# Patient Record
Sex: MALE | Race: WHITE | Employment: FULL TIME | URBAN - METROPOLITAN AREA
[De-identification: names, ages, dates, MRNs, and addresses within clinical notes are randomized per-mention and may not be internally consistent; named-entity substitution may affect disease eponyms.]

---

## 2019-03-02 ENCOUNTER — HOSPITAL ENCOUNTER (OUTPATIENT)
Age: 34
Discharge: HOME | End: 2019-03-02
Payer: COMMERCIAL

## 2019-03-02 LAB
A/G RATIO: 1.5 (ref 1.1–2.2)
ALBUMIN SERPL-MCNC: 4.2 G/DL (ref 3.4–5)
ALP BLD-CCNC: 66 U/L (ref 40–129)
ALT SERPL-CCNC: 30 U/L (ref 10–40)
ANION GAP SERPL CALCULATED.3IONS-SCNC: 11 MMOL/L (ref 3–16)
AST SERPL-CCNC: 24 U/L (ref 15–37)
BASOPHILS ABSOLUTE: 0 K/UL (ref 0–0.2)
BASOPHILS RELATIVE PERCENT: 0.6 %
BILIRUB SERPL-MCNC: 0.4 MG/DL (ref 0–1)
BUN BLDV-MCNC: 12 MG/DL (ref 7–20)
CALCIUM SERPL-MCNC: 9.1 MG/DL (ref 8.3–10.6)
CHLORIDE BLD-SCNC: 99 MMOL/L (ref 99–110)
CHOLESTEROL, TOTAL: 168 MG/DL (ref 0–199)
CO2: 27 MMOL/L (ref 21–32)
CREAT SERPL-MCNC: 0.8 MG/DL (ref 0.9–1.3)
EOSINOPHILS ABSOLUTE: 0.2 K/UL (ref 0–0.6)
EOSINOPHILS RELATIVE PERCENT: 2.4 %
GFR AFRICAN AMERICAN: >60
GFR NON-AFRICAN AMERICAN: >60
GLOBULIN: 2.8 G/DL
GLUCOSE BLD-MCNC: 96 MG/DL (ref 70–99)
HCT VFR BLD CALC: 42.6 % (ref 40.5–52.5)
HDLC SERPL-MCNC: 33 MG/DL (ref 40–60)
HEMOGLOBIN: 14.3 G/DL (ref 13.5–17.5)
LDL CHOLESTEROL CALCULATED: 106 MG/DL
LYMPHOCYTES ABSOLUTE: 2.7 K/UL (ref 1–5.1)
LYMPHOCYTES RELATIVE PERCENT: 43.1 %
MCH RBC QN AUTO: 31.4 PG (ref 26–34)
MCHC RBC AUTO-ENTMCNC: 33.6 G/DL (ref 31–36)
MCV RBC AUTO: 93.4 FL (ref 80–100)
MONOCYTES ABSOLUTE: 0.6 K/UL (ref 0–1.3)
MONOCYTES RELATIVE PERCENT: 9.2 %
NEUTROPHILS ABSOLUTE: 2.8 K/UL (ref 1.7–7.7)
NEUTROPHILS RELATIVE PERCENT: 44.7 %
PDW BLD-RTO: 12.5 % (ref 12.4–15.4)
PLATELET # BLD: 306 K/UL (ref 135–450)
PMV BLD AUTO: 9 FL (ref 5–10.5)
POTASSIUM SERPL-SCNC: 4.4 MMOL/L (ref 3.5–5.1)
RBC # BLD: 4.56 M/UL (ref 4.2–5.9)
SODIUM BLD-SCNC: 137 MMOL/L (ref 136–145)
TOTAL PROTEIN: 7 G/DL (ref 6.4–8.2)
TRIGL SERPL-MCNC: 147 MG/DL (ref 0–150)
TSH SERPL DL<=0.05 MIU/L-ACNC: 2.88 UIU/ML (ref 0.27–4.2)
VITAMIN D 25-HYDROXY: 19.4 NG/ML
VLDLC SERPL CALC-MCNC: 29 MG/DL
WBC # BLD: 6.3 K/UL (ref 4–11)

## 2019-03-02 PROCEDURE — 82306 VITAMIN D 25 HYDROXY: CPT

## 2019-03-02 PROCEDURE — 85025 COMPLETE CBC W/AUTO DIFF WBC: CPT

## 2019-03-02 PROCEDURE — 36415 COLL VENOUS BLD VENIPUNCTURE: CPT

## 2019-03-02 PROCEDURE — 84443 ASSAY THYROID STIM HORMONE: CPT

## 2019-03-02 PROCEDURE — 80053 COMPREHEN METABOLIC PANEL: CPT

## 2019-03-02 PROCEDURE — 80061 LIPID PANEL: CPT

## 2021-07-14 ENCOUNTER — HOSPITAL ENCOUNTER (OUTPATIENT)
Dept: NEUROLOGY | Age: 36
Discharge: HOME | End: 2021-07-14
Payer: COMMERCIAL

## 2021-07-14 DIAGNOSIS — R20.0 NUMBNESS OF ARM: ICD-10-CM

## 2021-07-14 PROCEDURE — 95908 NRV CNDJ TST 3-4 STUDIES: CPT

## 2021-07-14 PROCEDURE — 95886 MUSC TEST DONE W/N TEST COMP: CPT

## 2021-07-14 NOTE — PROCEDURES
Test Date:  2021    Patient: Demetrius Martinez : 1985 Physician: Megan Kasper DO   Sex: Male ID#:  Ref Phys: Chauncey THIAGO Berry     Patient Complaints:  Patient is a 29year-old male who presents with numbness in the left upper extremity onset one year ago     Patient History / Exam:  PMH no endocrine disease. no neck or arm surgery PE:  reflexes trace, + thumb opposition weakness    NCV & EMG Findings:  Evaluation of the right median (APB) motor nerve showed prolonged distal onset latency (7.9 ms) and reduced amplitude (4.1 mV). The right median sensory nerve showed prolonged distal peak latency (5.5 ms), reduced amplitude (9 µV), and decreased conduction velocity (25 m/s). The right ulnar sensory nerve showed reduced amplitude (15 µV). All remaining nerves (as indicated in the following tables) were within normal limits. All examined muscles (as indicated in the following table) showed no evidence of electrical instability. Impression:  Study is consistent  with right carpal tunnel syndrome, moderate severity. No evidence of an acute radiculopathy or other entrapment neuropathy. Thank you.          Megan Kasper DO        Nerve Conduction Studies  Motor Nerve Results      Latency Amplitude F-Lat Segment Distance CV Comment   Site (ms) Norm (mV) Norm (ms)  (cm) (m/s) Norm    Right Median (APB) Motor   Wrist 7.9  < 4.2 4.1  > 5.0         Elbow 11.4 - 4.6 -  Elbow-Wrist 20 57  > 50    Right Ulnar (ADM) Motor   Wrist 3.3  < 4.2 10.6  > 3.0         Bel Elbow 6.6 - 9.6 -  Bel Elbow-Wrist 22 67  > 50    Abv Elbow 7.8 - 9.5 -  Abv Elbow-Bel Elbow 7 58  > 48      Sensory Nerve Results      Latency (Peak) Amplitude (P-P) Segment Distance CV Comment   Site (ms) Norm (µV) Norm  (cm) (m/s) Norm    Right Median Sensory   Wrist-Dig II 5.5  < 3.6 9  > 10 Wrist-Dig II 14 25  > 39    Right Ulnar Sensory   Wrist-Dig V 3.3  < 3.7 15  > 15 Wrist-Dig V 14 42  > 38

## 2023-03-03 ENCOUNTER — APPOINTMENT (OUTPATIENT)
Dept: GENERAL RADIOLOGY | Age: 38
DRG: 202 | End: 2023-03-03
Payer: COMMERCIAL

## 2023-03-03 ENCOUNTER — HOSPITAL ENCOUNTER (INPATIENT)
Age: 38
LOS: 1 days | Discharge: HOME | DRG: 202 | End: 2023-03-05
Attending: EMERGENCY MEDICINE | Admitting: INTERNAL MEDICINE
Payer: COMMERCIAL

## 2023-03-03 ENCOUNTER — APPOINTMENT (OUTPATIENT)
Dept: CT IMAGING | Age: 38
DRG: 202 | End: 2023-03-03
Payer: COMMERCIAL

## 2023-03-03 DIAGNOSIS — R00.0 TACHYCARDIA: ICD-10-CM

## 2023-03-03 DIAGNOSIS — R06.02 SHORTNESS OF BREATH: Primary | ICD-10-CM

## 2023-03-03 DIAGNOSIS — R94.31 ELECTROCARDIOGRAM SHOWING T WAVE ABNORMALITIES: ICD-10-CM

## 2023-03-03 LAB
A/G RATIO: 1.3 (ref 1.1–2.2)
ALBUMIN SERPL-MCNC: 3.9 G/DL (ref 3.4–5)
ALP BLD-CCNC: 77 U/L (ref 40–129)
ALT SERPL-CCNC: 38 U/L (ref 10–40)
ANION GAP SERPL CALCULATED.3IONS-SCNC: 10 MMOL/L (ref 3–16)
AST SERPL-CCNC: 56 U/L (ref 15–37)
BASE EXCESS VENOUS: 0.6 MMOL/L (ref -3–3)
BASOPHILS ABSOLUTE: 0 K/UL (ref 0–0.2)
BASOPHILS RELATIVE PERCENT: 0.5 %
BILIRUB SERPL-MCNC: 0.3 MG/DL (ref 0–1)
BUN BLDV-MCNC: 12 MG/DL (ref 7–20)
CALCIUM SERPL-MCNC: 8.9 MG/DL (ref 8.3–10.6)
CARBOXYHEMOGLOBIN: 1.1 % (ref 0–1.5)
CHLORIDE BLD-SCNC: 99 MMOL/L (ref 99–110)
CO2: 26 MMOL/L (ref 21–32)
CREAT SERPL-MCNC: 0.9 MG/DL (ref 0.9–1.3)
D DIMER: 0.37 UG/ML FEU (ref 0–0.6)
EOSINOPHILS ABSOLUTE: 0.1 K/UL (ref 0–0.6)
EOSINOPHILS RELATIVE PERCENT: 0.7 %
GFR SERPL CREATININE-BSD FRML MDRD: >60 ML/MIN/{1.73_M2}
GLUCOSE BLD-MCNC: 129 MG/DL (ref 70–99)
HCO3 VENOUS: 26 MMOL/L (ref 23–29)
HCT VFR BLD CALC: 39.1 % (ref 40.5–52.5)
HEMOGLOBIN: 13.3 G/DL (ref 13.5–17.5)
INFLUENZA A: NOT DETECTED
INFLUENZA B: NOT DETECTED
LACTIC ACID, SEPSIS: 2.8 MMOL/L (ref 0.4–1.9)
LIPASE: 35 U/L (ref 13–60)
LYMPHOCYTES ABSOLUTE: 2.3 K/UL (ref 1–5.1)
LYMPHOCYTES RELATIVE PERCENT: 34 %
MCH RBC QN AUTO: 31.7 PG (ref 26–34)
MCHC RBC AUTO-ENTMCNC: 34.1 G/DL (ref 31–36)
MCV RBC AUTO: 92.9 FL (ref 80–100)
METHEMOGLOBIN VENOUS: 0.6 %
MONOCYTES ABSOLUTE: 0.8 K/UL (ref 0–1.3)
MONOCYTES RELATIVE PERCENT: 11.7 %
NEUTROPHILS ABSOLUTE: 3.7 K/UL (ref 1.7–7.7)
NEUTROPHILS RELATIVE PERCENT: 53.1 %
O2 SAT, VEN: 71 %
O2 THERAPY: NORMAL
PCO2, VEN: 44.8 MMHG (ref 40–50)
PDW BLD-RTO: 13 % (ref 12.4–15.4)
PH VENOUS: 7.38 (ref 7.35–7.45)
PLATELET # BLD: 274 K/UL (ref 135–450)
PMV BLD AUTO: 8.4 FL (ref 5–10.5)
PO2, VEN: 37.5 MMHG (ref 25–40)
POTASSIUM SERPL-SCNC: 5.6 MMOL/L (ref 3.5–5.1)
RBC # BLD: 4.21 M/UL (ref 4.2–5.9)
SARS-COV-2 RNA, RT PCR: NOT DETECTED
SODIUM BLD-SCNC: 135 MMOL/L (ref 136–145)
TCO2 CALC VENOUS: 27 MMOL/L
TOTAL PROTEIN: 6.9 G/DL (ref 6.4–8.2)
TROPONIN: <0.01 NG/ML
WBC # BLD: 6.9 K/UL (ref 4–11)

## 2023-03-03 PROCEDURE — 82803 BLOOD GASES ANY COMBINATION: CPT

## 2023-03-03 PROCEDURE — 85379 FIBRIN DEGRADATION QUANT: CPT

## 2023-03-03 PROCEDURE — 83690 ASSAY OF LIPASE: CPT

## 2023-03-03 PROCEDURE — 93005 ELECTROCARDIOGRAM TRACING: CPT | Performed by: EMERGENCY MEDICINE

## 2023-03-03 PROCEDURE — 83605 ASSAY OF LACTIC ACID: CPT

## 2023-03-03 PROCEDURE — 99285 EMERGENCY DEPT VISIT HI MDM: CPT

## 2023-03-03 PROCEDURE — 85025 COMPLETE CBC W/AUTO DIFF WBC: CPT

## 2023-03-03 PROCEDURE — 2580000003 HC RX 258: Performed by: EMERGENCY MEDICINE

## 2023-03-03 PROCEDURE — 71045 X-RAY EXAM CHEST 1 VIEW: CPT

## 2023-03-03 PROCEDURE — 6370000000 HC RX 637 (ALT 250 FOR IP): Performed by: EMERGENCY MEDICINE

## 2023-03-03 PROCEDURE — 71260 CT THORAX DX C+: CPT

## 2023-03-03 PROCEDURE — 96374 THER/PROPH/DIAG INJ IV PUSH: CPT

## 2023-03-03 PROCEDURE — 87636 SARSCOV2 & INF A&B AMP PRB: CPT

## 2023-03-03 PROCEDURE — 80053 COMPREHEN METABOLIC PANEL: CPT

## 2023-03-03 PROCEDURE — 6360000004 HC RX CONTRAST MEDICATION

## 2023-03-03 PROCEDURE — 84484 ASSAY OF TROPONIN QUANT: CPT

## 2023-03-03 PROCEDURE — 96361 HYDRATE IV INFUSION ADD-ON: CPT

## 2023-03-03 RX ORDER — IPRATROPIUM BROMIDE AND ALBUTEROL SULFATE 2.5; .5 MG/3ML; MG/3ML
3 SOLUTION RESPIRATORY (INHALATION) ONCE
Status: COMPLETED | OUTPATIENT
Start: 2023-03-03 | End: 2023-03-03

## 2023-03-03 RX ORDER — 0.9 % SODIUM CHLORIDE 0.9 %
1000 INTRAVENOUS SOLUTION INTRAVENOUS ONCE
Status: COMPLETED | OUTPATIENT
Start: 2023-03-03 | End: 2023-03-04

## 2023-03-03 RX ORDER — ALBUTEROL SULFATE 90 UG/1
AEROSOL, METERED RESPIRATORY (INHALATION)
COMMUNITY
Start: 2023-03-01

## 2023-03-03 RX ORDER — PREDNISONE 20 MG/1
TABLET ORAL
Status: ON HOLD | COMMUNITY
Start: 2023-03-01 | End: 2023-03-05 | Stop reason: HOSPADM

## 2023-03-03 RX ORDER — AMOXICILLIN AND CLAVULANATE POTASSIUM 875; 125 MG/1; MG/1
TABLET, FILM COATED ORAL
Status: ON HOLD | COMMUNITY
Start: 2023-03-01 | End: 2023-03-05 | Stop reason: HOSPADM

## 2023-03-03 RX ADMIN — IOPAMIDOL 75 ML: 755 INJECTION, SOLUTION INTRAVENOUS at 23:47

## 2023-03-03 RX ADMIN — IPRATROPIUM BROMIDE AND ALBUTEROL SULFATE 3 AMPULE: 2.5; .5 SOLUTION RESPIRATORY (INHALATION) at 22:38

## 2023-03-03 RX ADMIN — SODIUM CHLORIDE 1000 ML: 9 INJECTION, SOLUTION INTRAVENOUS at 23:07

## 2023-03-03 ASSESSMENT — PAIN DESCRIPTION - DESCRIPTORS: DESCRIPTORS: SHARP

## 2023-03-03 ASSESSMENT — PAIN - FUNCTIONAL ASSESSMENT
PAIN_FUNCTIONAL_ASSESSMENT: 0-10
PAIN_FUNCTIONAL_ASSESSMENT: PREVENTS OR INTERFERES SOME ACTIVE ACTIVITIES AND ADLS

## 2023-03-03 ASSESSMENT — PAIN DESCRIPTION - LOCATION: LOCATION: RIB CAGE

## 2023-03-03 ASSESSMENT — PAIN SCALES - GENERAL: PAINLEVEL_OUTOF10: 5

## 2023-03-03 ASSESSMENT — PAIN DESCRIPTION - PAIN TYPE: TYPE: ACUTE PAIN

## 2023-03-03 NOTE — LETTER
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 He may return to work on 3/7/2023.       Sincerely,       Rani Wall RN         Signature:__________________________________

## 2023-03-04 PROBLEM — J45.901 ASTHMA EXACERBATION: Status: ACTIVE | Noted: 2023-03-04

## 2023-03-04 PROBLEM — J45.901 ASTHMA EXACERBATION ATTACKS: Status: ACTIVE | Noted: 2023-03-04

## 2023-03-04 PROBLEM — G47.30 OBSERVED SLEEP APNEA: Status: ACTIVE | Noted: 2023-03-04

## 2023-03-04 PROBLEM — E66.812 CLASS 2 OBESITY IN ADULT: Status: ACTIVE | Noted: 2023-03-04

## 2023-03-04 PROBLEM — E66.9 CLASS 2 OBESITY IN ADULT: Status: ACTIVE | Noted: 2023-03-04

## 2023-03-04 PROBLEM — R91.8 PULMONARY INFILTRATES: Status: ACTIVE | Noted: 2023-03-04

## 2023-03-04 PROBLEM — R03.0 ELEVATED BLOOD PRESSURE READING: Status: ACTIVE | Noted: 2023-03-04

## 2023-03-04 PROBLEM — R79.89 ELEVATED LACTIC ACID LEVEL: Status: ACTIVE | Noted: 2023-03-04

## 2023-03-04 LAB
A/G RATIO: 1.5 (ref 1.1–2.2)
ALBUMIN SERPL-MCNC: 3.7 G/DL (ref 3.4–5)
ALP BLD-CCNC: 80 U/L (ref 40–129)
ALT SERPL-CCNC: 34 U/L (ref 10–40)
ANION GAP SERPL CALCULATED.3IONS-SCNC: 12 MMOL/L (ref 3–16)
AST SERPL-CCNC: 31 U/L (ref 15–37)
BILIRUB SERPL-MCNC: 0.3 MG/DL (ref 0–1)
BUN BLDV-MCNC: 12 MG/DL (ref 7–20)
CALCIUM SERPL-MCNC: 8.3 MG/DL (ref 8.3–10.6)
CHLORIDE BLD-SCNC: 101 MMOL/L (ref 99–110)
CO2: 24 MMOL/L (ref 21–32)
CREAT SERPL-MCNC: 0.9 MG/DL (ref 0.9–1.3)
EKG ATRIAL RATE: 121 BPM
EKG DIAGNOSIS: NORMAL
EKG P AXIS: 53 DEGREES
EKG P-R INTERVAL: 132 MS
EKG Q-T INTERVAL: 328 MS
EKG QRS DURATION: 82 MS
EKG QTC CALCULATION (BAZETT): 465 MS
EKG R AXIS: 57 DEGREES
EKG T AXIS: -24 DEGREES
EKG VENTRICULAR RATE: 121 BPM
GFR SERPL CREATININE-BSD FRML MDRD: >60 ML/MIN/{1.73_M2}
GLUCOSE BLD-MCNC: 131 MG/DL (ref 70–99)
LACTIC ACID, SEPSIS: 3 MMOL/L (ref 0.4–1.9)
LACTIC ACID: 2.6 MMOL/L (ref 0.4–2)
POTASSIUM SERPL-SCNC: 3.6 MMOL/L (ref 3.5–5.1)
PROCALCITONIN: 0.17 NG/ML (ref 0–0.15)
SODIUM BLD-SCNC: 137 MMOL/L (ref 136–145)
TOTAL PROTEIN: 6.1 G/DL (ref 6.4–8.2)

## 2023-03-04 PROCEDURE — 6360000002 HC RX W HCPCS: Performed by: INTERNAL MEDICINE

## 2023-03-04 PROCEDURE — 36415 COLL VENOUS BLD VENIPUNCTURE: CPT

## 2023-03-04 PROCEDURE — 99222 1ST HOSP IP/OBS MODERATE 55: CPT | Performed by: INTERNAL MEDICINE

## 2023-03-04 PROCEDURE — 6370000000 HC RX 637 (ALT 250 FOR IP): Performed by: INTERNAL MEDICINE

## 2023-03-04 PROCEDURE — 80053 COMPREHEN METABOLIC PANEL: CPT

## 2023-03-04 PROCEDURE — 83605 ASSAY OF LACTIC ACID: CPT

## 2023-03-04 PROCEDURE — 93010 ELECTROCARDIOGRAM REPORT: CPT | Performed by: INTERNAL MEDICINE

## 2023-03-04 PROCEDURE — 94640 AIRWAY INHALATION TREATMENT: CPT

## 2023-03-04 PROCEDURE — 84145 PROCALCITONIN (PCT): CPT

## 2023-03-04 PROCEDURE — 2060000000 HC ICU INTERMEDIATE R&B

## 2023-03-04 RX ORDER — BENZONATATE 100 MG/1
200 CAPSULE ORAL 3 TIMES DAILY PRN
Status: DISCONTINUED | OUTPATIENT
Start: 2023-03-04 | End: 2023-03-05 | Stop reason: HOSPADM

## 2023-03-04 RX ORDER — ACETAMINOPHEN 650 MG/1
650 SUPPOSITORY RECTAL EVERY 4 HOURS PRN
Status: DISCONTINUED | OUTPATIENT
Start: 2023-03-04 | End: 2023-03-05 | Stop reason: HOSPADM

## 2023-03-04 RX ORDER — METHYLPREDNISOLONE SODIUM SUCCINATE 40 MG/ML
40 INJECTION, POWDER, LYOPHILIZED, FOR SOLUTION INTRAMUSCULAR; INTRAVENOUS EVERY 12 HOURS
Status: DISCONTINUED | OUTPATIENT
Start: 2023-03-04 | End: 2023-03-05

## 2023-03-04 RX ORDER — CALCIUM CARBONATE 200(500)MG
1000 TABLET,CHEWABLE ORAL 3 TIMES DAILY PRN
Status: DISCONTINUED | OUTPATIENT
Start: 2023-03-04 | End: 2023-03-05 | Stop reason: HOSPADM

## 2023-03-04 RX ORDER — MAGNESIUM SULFATE 1 G/100ML
1000 INJECTION INTRAVENOUS ONCE
Status: COMPLETED | OUTPATIENT
Start: 2023-03-04 | End: 2023-03-04

## 2023-03-04 RX ORDER — METHYLPREDNISOLONE SODIUM SUCCINATE 40 MG/ML
40 INJECTION, POWDER, LYOPHILIZED, FOR SOLUTION INTRAMUSCULAR; INTRAVENOUS ONCE
Status: COMPLETED | OUTPATIENT
Start: 2023-03-04 | End: 2023-03-04

## 2023-03-04 RX ORDER — AZITHROMYCIN 250 MG/1
250 TABLET, FILM COATED ORAL DAILY
Status: DISCONTINUED | OUTPATIENT
Start: 2023-03-05 | End: 2023-03-05 | Stop reason: HOSPADM

## 2023-03-04 RX ORDER — ALBUTEROL SULFATE 90 UG/1
2 AEROSOL, METERED RESPIRATORY (INHALATION) EVERY 4 HOURS PRN
Status: DISCONTINUED | OUTPATIENT
Start: 2023-03-04 | End: 2023-03-05 | Stop reason: HOSPADM

## 2023-03-04 RX ORDER — PANTOPRAZOLE SODIUM 40 MG/1
40 TABLET, DELAYED RELEASE ORAL
Status: DISCONTINUED | OUTPATIENT
Start: 2023-03-05 | End: 2023-03-05 | Stop reason: HOSPADM

## 2023-03-04 RX ORDER — AZITHROMYCIN 250 MG/1
500 TABLET, FILM COATED ORAL ONCE
Status: COMPLETED | OUTPATIENT
Start: 2023-03-04 | End: 2023-03-04

## 2023-03-04 RX ORDER — ENOXAPARIN SODIUM 100 MG/ML
40 INJECTION SUBCUTANEOUS DAILY
Status: DISCONTINUED | OUTPATIENT
Start: 2023-03-04 | End: 2023-03-05 | Stop reason: HOSPADM

## 2023-03-04 RX ORDER — ACETAMINOPHEN 325 MG/1
650 TABLET ORAL EVERY 4 HOURS PRN
Status: DISCONTINUED | OUTPATIENT
Start: 2023-03-04 | End: 2023-03-05 | Stop reason: HOSPADM

## 2023-03-04 RX ORDER — LANOLIN ALCOHOL/MO/W.PET/CERES
3 CREAM (GRAM) TOPICAL NIGHTLY PRN
Status: DISCONTINUED | OUTPATIENT
Start: 2023-03-04 | End: 2023-03-05 | Stop reason: HOSPADM

## 2023-03-04 RX ADMIN — METHYLPREDNISOLONE SODIUM SUCCINATE 40 MG: 40 INJECTION, POWDER, FOR SOLUTION INTRAMUSCULAR; INTRAVENOUS at 15:35

## 2023-03-04 RX ADMIN — AZITHROMYCIN MONOHYDRATE 500 MG: 250 TABLET ORAL at 08:46

## 2023-03-04 RX ADMIN — BENZONATATE 200 MG: 100 CAPSULE ORAL at 12:26

## 2023-03-04 RX ADMIN — METHYLPREDNISOLONE SODIUM SUCCINATE 40 MG: 40 INJECTION, POWDER, LYOPHILIZED, FOR SOLUTION INTRAMUSCULAR; INTRAVENOUS at 02:45

## 2023-03-04 RX ADMIN — ACETAMINOPHEN 325MG 650 MG: 325 TABLET ORAL at 04:11

## 2023-03-04 RX ADMIN — BENZONATATE 200 MG: 100 CAPSULE ORAL at 20:15

## 2023-03-04 RX ADMIN — BENZONATATE 200 MG: 100 CAPSULE ORAL at 05:45

## 2023-03-04 RX ADMIN — MAGNESIUM SULFATE HEPTAHYDRATE 1000 MG: 1 INJECTION, SOLUTION INTRAVENOUS at 17:21

## 2023-03-04 RX ADMIN — Medication 2 PUFF: at 20:12

## 2023-03-04 RX ADMIN — ENOXAPARIN SODIUM 40 MG: 100 INJECTION SUBCUTANEOUS at 08:46

## 2023-03-04 ASSESSMENT — ENCOUNTER SYMPTOMS
SHORTNESS OF BREATH: 1
CONSTIPATION: 0
ABDOMINAL PAIN: 0
VOMITING: 0
COUGH: 1
NAUSEA: 0
SORE THROAT: 1
DIARRHEA: 0
RHINORRHEA: 1

## 2023-03-04 ASSESSMENT — PAIN SCALES - WONG BAKER
WONGBAKER_NUMERICALRESPONSE: 0
WONGBAKER_NUMERICALRESPONSE: 0

## 2023-03-04 ASSESSMENT — PAIN DESCRIPTION - LOCATION: LOCATION: HEAD

## 2023-03-04 ASSESSMENT — PAIN SCALES - GENERAL
PAINLEVEL_OUTOF10: 2
PAINLEVEL_OUTOF10: 3
PAINLEVEL_OUTOF10: 3

## 2023-03-04 ASSESSMENT — HEART SCORE: ECG: 1

## 2023-03-04 NOTE — PROGRESS NOTES
Hospitalist Progress Note      PCP: MJ Otero NP    Date of Admission: 3/3/2023    Chief Complaint: Shortness of breath and cough    Hospital Course: This 20-year-old male with a history of asthma recently traveled to [place] and back admitted with shortness of breath and cough causing him to nearly lose consciousness failed outpatient treatment with the prednisone, Augmentin, albuterol inhaler. Subjective: Patient lying in the bed sleepy denies any chest pain shortness of breath has improved history of asthma has not seen pulmonary      Medications:  Reviewed    Infusion Medications   Scheduled Medications    methylPREDNISolone  40 mg IntraVENous Q12H    [START ON 3/5/2023] azithromycin  250 mg Oral Daily    enoxaparin  40 mg SubCUTAneous Daily     PRN Meds: melatonin, calcium carbonate, acetaminophen, acetaminophen, benzonatate, albuterol sulfate HFA    No intake or output data in the 24 hours ending 03/04/23 0926    Physical Exam Performed:    /79   Pulse (!) 101   Temp 99.8 °F (37.7 °C) (Oral)   Resp 20   Ht 5' 7\" (1.702 m)   Wt 224 lb 13.9 oz (102 kg)   SpO2 95%   BMI 35.22 kg/m²     General appearance: No apparent distress, appears stated age and cooperative. HEENT: Pupils equal, round, and reactive to light. Conjunctivae/corneas clear. Neck: Supple, with full range of motion. No jugular venous distention. Trachea midline. Respiratory:  Normal respiratory effort. Clear to auscultation, bilaterally without Rales/Wheezes/Rhonchi. Cardiovascular: Regular rate and rhythm with normal S1/S2 without murmurs, rubs or gallops. Abdomen: Soft, non-tender, non-distended with normal bowel sounds. Musculoskeletal: No clubbing, cyanosis or edema bilaterally. Full range of motion without deformity. Skin: Skin color, texture, turgor normal.  No rashes or lesions. Neurologic:  Neurovascularly intact without any focal sensory/motor deficits.  Cranial nerves: II-XII intact, grossly non-focal.  Psychiatric: Alert and oriented, thought content appropriate, normal insight  Capillary Refill: Brisk, 3 seconds, normal   Peripheral Pulses: +2 palpable, equal bilaterally       Labs:   Recent Labs     03/03/23 2227   WBC 6.9   HGB 13.3*   HCT 39.1*        Recent Labs     03/03/23 2227 03/04/23  0000   * 137   K 5.6* 3.6   CL 99 101   CO2 26 24   BUN 12 12   CREATININE 0.9 0.9   CALCIUM 8.9 8.3     Recent Labs     03/03/23 2227 03/04/23  0000   AST 56* 31   ALT 38 34   BILITOT 0.3 0.3   ALKPHOS 77 80     No results for input(s): INR in the last 72 hours. Recent Labs     03/03/23 2227   TROPONINI <0.01       Urinalysis:    No results found for: Layvonne Lease, BACTERIA, RBCUA, BLOODU, SPECGRAV, GLUCOSEU    Radiology:  CT CHEST PULMONARY EMBOLISM W CONTRAST   Final Result   No evidence of an acute pulmonary embolus. Minimal/mild tree-in-bud nodularity in the right upper lobe posterior segment   and right lower lobe superior segment, likely inflammatory/infectious. XR CHEST PORTABLE   Final Result   No acute process. IP CONSULT TO HOSPITALIST    Assessment/Plan:    Active Hospital Problems    Diagnosis     Asthma exacerbation [J45.901]      Priority: Medium    Asthma exacerbation attacks [J45.901]      Priority: Medium     This 43-year-old admitted with asthma exacerbation continue with the steroid, Z[facility]romax at [facility], will consult pulmonary. DVT Prophylaxis: Lovenox subcu  Diet: ADULT DIET;  Regular  Code Status: Full code  PT/OT Eval Status:     Dispo -     Appropriate for A1 Discharge Unit: Lily Reyes MD

## 2023-03-04 NOTE — H&P
Hospital Medicine History & Physical      Patient: Noe Woodruff  :  1985  MRN:  [id number]    Date of Service: 23    Chief Complaint   Patient presents with    Cough    Shortness of Breath     Reports cough with diff breathing for 2 days, states he went to [address] [place] [address] 2 days ago and was given inhaler and antibiotic. He states he has not improved on medication. HISTORY OF PRESENT ILLNESS:    Noe Woodruff is a 41 y.o. male. He presented to the ER for severe cough which was causing him to nearly lose consciousness. The patient recently traveled to [place] and back. About 6 days ago during his trip he developed upper respiratory symptoms. He described sinus congestion and post nasal drip. Over the course of several days he described a worsening cough, then chest congestion, wheezing, and finally dyspnea. He described severe coughing fits at times which almost caused him to lose consciousness. He sought care at a [address] [place] [address] 2 days prior to presenting. He was prescribed prednisone, albuterol, and augmentin. He has taken two doses of prednisone and three of augmentin so far. He has not improved significantly. He relates it seemed likely everyone in the family has become ill with similar symptoms. Patient relates he had asthma as a young child, but has not required any sort of treatment for it for decades. Review of Systems:  All pertinent positives and negatives are as noted in the HPI section. All other systems were reviewed and are negative. Past Medical History:   Diagnosis Date    Asthma        History reviewed. No pertinent surgical history. Prior to Admission medications    Medication Sig Start Date End Date Taking?  Authorizing Provider   albuterol sulfate HFA (PROVENTIL;VENTOLIN;PROAIR) 108 (90 Base) MCG/ACT inhaler  3/1/23   Historical Provider, MD   amoxicillin-clavulanate (AUGMENTIN) 875-125 MG per tablet  3/1/23   Historical Provider, MD predniSONE (DELTASONE) 20 MG tablet  3/1/23   Historical Provider, MD       Allergies:   Patient has no known allergies. Social:   reports that he has never smoked. He has never used smokeless tobacco.   reports current alcohol use. Social History     Substance and Sexual Activity   Drug Use No       Family history  No known pertinent family history    PHYSICAL EXAM:  I performed this physical examination. Vitals:  Patient Vitals for the past 24 hrs:   BP Temp Temp src Pulse Resp SpO2 Height Weight   03/04/23 0300 (!) 144/80 -- -- 99 20 94 % -- --   03/04/23 0229 121/73 -- -- (!) 103 18 97 % -- --   03/04/23 0159 135/67 -- -- 96 16 97 % -- --   03/04/23 0129 120/67 -- -- (!) 108 20 96 % -- --   03/04/23 0029 133/63 -- -- (!) 106 19 97 % -- --   03/04/23 0023 -- -- -- 95 30 96 % -- --   03/03/23 2359 138/76 -- -- (!) 106 16 96 % -- --   03/03/23 2330 129/80 -- -- 100 14 99 % -- --   03/03/23 2300 118/68 -- -- (!) 112 19 97 % -- --   03/03/23 2229 113/62 -- -- (!) 106 23 96 % -- --   03/03/23 2141 -- -- -- -- -- -- 5' 7\" (1.702 m) --   03/03/23 2135 138/74 97.8 °F (36.6 °C) Oral (!) 112 18 98 % -- 230 lb (104.3 kg)     Room air    GEN:  Appearance:  Age appropriate WM in NAD . Level of Consciousness:  alert . Orientation:  full    HEENT: Sclera anicteric.  no conjunctival chemosis. moist mucus membranes. no specific or diagnostic oral lesions. NECK:  no signs of meningismus. Jugular veins not distended. Carotid pulses  2+.  no cervical lymphadenopathy. no thyromegaly. CV:  regular rhythm. normal S1 & S2.    no murmur. no rub.  no gallop. PULM:  Chest excursion is symmetric. Breath sounds are somewhat diminished but generally vesicular. Cough is easily provoked by inspiratory effort. .    Adventitious sounds:  Prolonged expiratory wheezing throughout. AB:  Abdominal shape is normal.  Bowel sounds are active. Generally soft to palpation. no tenderness is present.     no involuntary guarding. no rebound guarding. EXTR:  Skin is warm. Capillary refill brisk. no specific or pathognomic rash. no clubbing. no pitting edema. no active wound or ulcer. Pulses 2+ x 4    LABS:  Lab Results   Component Value Date    WBC 6.9 03/03/2023    HGB 13.3 (L) 03/03/2023    HCT 39.1 (L) 03/03/2023    MCV 92.9 03/03/2023     03/03/2023     Lab Results   Component Value Date    CREATININE 0.9 03/04/2023    BUN 12 03/04/2023     03/04/2023    K 3.6 03/04/2023     03/04/2023    CO2 24 03/04/2023     Lab Results   Component Value Date    ALT 34 03/04/2023    AST 31 03/04/2023    ALKPHOS 80 03/04/2023    BILITOT 0.3 03/04/2023     Lab Results   Component Value Date    TROPONINI <0.01 03/03/2023     No results for input(s): PHART, [id number], PO2ART in the last 72 hours. IMAGING:  XR CHEST PORTABLE    Result Date: 3/3/2023  EXAMINATION: ONE XRAY VIEW OF THE CHEST 3/3/2023 9:55 pm COMPARISON: 09/11/2014 HISTORY: ORDERING SYSTEM PROVIDED HISTORY: other TECHNOLOGIST PROVIDED HISTORY: Reason for exam:->other Reason for Exam: cp FINDINGS: The lungs and pleural spaces are without acute focal process. The cardiomediastinal silhouette is without acute process. There is no evidence of pneumothorax. The osseous structures are without acute process. No acute process. CT CHEST PULMONARY EMBOLISM W CONTRAST    Result Date: 3/4/2023  EXAMINATION: CTA OF THE CHEST 3/3/2023 11:46 pm TECHNIQUE: CTA of the chest was performed after the administration of intravenous contrast.  Multiplanar reformatted images are provided for review. MIP images are provided for review. Automated exposure control, iterative reconstruction, and/or weight based adjustment of the mA/kV was utilized to reduce the radiation dose to as low as reasonably achievable.  COMPARISON: Chest x-ray 03/03/2023 HISTORY: ORDERING SYSTEM PROVIDED HISTORY: SOB w/ tachycardia TECHNOLOGIST PROVIDED HISTORY: Reason for exam:->SOB w/ tachycardia Decision Support Exception - unselect if not a suspected or confirmed emergency medical condition->Emergency Medical Condition (MA) Reason for Exam: sob and progressive cough x 1 week. worse tonight FINDINGS: Pulmonary Arteries: Pulmonary arteries are adequately opacified for evaluation. No evidence of intraluminal filling defect to suggest pulmonary embolism. Main pulmonary artery is normal in caliber. Mediastinum: No evidence of mediastinal lymphadenopathy. The heart and pericardium demonstrate no acute abnormality. There is no acute abnormality of the thoracic aorta. Lungs/pleura: There is minimal/mild tree-in-bud nodularity involving the posterior segment the right upper lobe and superior segment of the right lower lobe. Lungs are otherwise clear. No confluent airspace disease, pneumothorax or pleural effusion. Upper Abdomen: Fatty infiltration of the liver. Soft Tissues/Bones: No acute bone or soft tissue abnormality. No evidence of an acute pulmonary embolus. Minimal/mild tree-in-bud nodularity in the right upper lobe posterior segment and right lower lobe superior segment, likely inflammatory/infectious. I directly reviewed all recent imaging studies as well as pertinent prior studies. Radiology reports may or may not be available at the time of my review. EKG:  New and pertinent prior tracings were directly reviewed. My interpretation is as follows:  Sinus tachycardia. Widespread T-wave inverions II, III, aVF and V3-6. 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 Patient advised that if he begins to have frequent exacerbations or remains symptoms in between exacerbations that he should be started on an inhaled steroid.   -  Start solumedrol 40mg IV q12h, prn albuterol, azithromycin x 5 days, and as needed antitussives. Abnormal EKG  -  Warrants additional CV risk stratification after discharge after resolution of asthma exacerbation. DVT prophylaxis: SCDs, lovenox  Code Status:  Full  Disposition:  Observation anticipated d/c to home in 1-2 days.     Maribeth Hurst MD MD

## 2023-03-04 NOTE — ED PROVIDER NOTES
[address] [facility]  ED  EMERGENCY DEPARTMENT ENCOUNTER        Patient Name: René Horner  MRN: [id number]  Juan Josegfshelby 1985  Date of evaluation: 3/3/2023  Attending Provider: Dr. Mac Schofield  Resident Provider: Bailee Talley MD  PCP: MJ Gerardo - MOOSE  Note Started: 9:46 PM EST 3/3/23    CHIEF COMPLAINT       Cough and Shortness of Breath (Reports cough with diff breathing for 2 days, states he went to [facility] clinic 2 days ago and was given inhaler and antibiotic. He states he has not improved on medication. )      HISTORY OF PRESENT ILLNESS: 1 or more Elements     History from : Patient    Limitations to history : None    René Horner is a 41 y.o. male who presents for cough and shortness of breath the past 4 days. He reports initial development of congestion and rhinitis with associated sore throat approximately 1 week ago. He then developed productive cough of yellow sputum with shortness of breath approximately 4 days ago. He was evaluated at the [facility] clinic 2 days ago and was discharged with albuterol inhaler, steroids, antibiotics. He reports no significant improvement in his symptoms with this medical management. Today he reports shortness of breath, chest pain secondary to cough, nonproductive cough, lightheadedness, headache. Denies associated fever, chills, abdominal pain, nausea, emesis, diarrhea. Last took albuterol just before presenting to ED. Does report recent travel to see via car approximately 5 hours each way. Personal history of DVT or PE. Not currently on any medications chronically. Past medical history includes possible hypertension, hyperlipidemia, asthma. Has not had asthma exacerbation since childhood. Has never been hospitalized for an exacerbation. Nursing Notes were all reviewed and agreed with or any disagreements were addressed in the HPI. REVIEW OF SYSTEMS :      Review of Systems   Constitutional:  Positive for fatigue. Negative for appetite change, chills and fever. HENT:  Positive for congestion, rhinorrhea and sore throat. Eyes:  Negative for visual disturbance. Respiratory:  Positive for cough and shortness of breath. Cardiovascular:  Positive for chest pain. Negative for leg swelling. Gastrointestinal:  Negative for abdominal pain, constipation, diarrhea, nausea and vomiting. Genitourinary:  Negative for difficulty urinating. Neurological:  Positive for light-headedness and headaches. Negative for dizziness and weakness. Positives and Pertinent negatives as per HPI. SURGICAL HISTORY   History reviewed. No pertinent surgical history. CURRENTMEDICATIONS       Previous Medications    ALBUTEROL SULFATE HFA (PROVENTIL;VENTOLIN;PROAIR) 108 (90 BASE) MCG/ACT INHALER        AMOXICILLIN-CLAVULANATE (AUGMENTIN) 875-125 MG PER TABLET        PREDNISONE (DELTASONE) 20 MG TABLET           ALLERGIES     Patient has no known allergies. FAMILYHISTORY     History reviewed. No pertinent family history. SOCIAL HISTORY       Social History     Tobacco Use    Smoking status: Never    Smokeless tobacco: Never   Vaping Use    Vaping Use: Never used   Substance Use Topics    Alcohol use: Yes     Comment: Occasionally    Drug use: No       SCREENINGS        [place] Coma Scale  Eye Opening: Spontaneous  Best Verbal Response: Oriented  Best Motor Response: Obeys commands  [place]ad[place] Coma Scale Score: 15                CIWA Assessment  BP: 138/76  Heart Rate: 95           PHYSICAL EXAM  1 or more Elements     ED Triage Vitals   BP Temp Temp Source Heart Rate Resp SpO2 Height Weight   03/03/23 2135 03/03/23 2135 03/03/23 2135 03/03/23 2135 03/03/23 2135 03/03/23 2135 03/03/23 2141 03/03/23 2135   138/74 97.8 °F (36.6 °C) Oral (!) 112 18 98 % 5' 7\" (1.702 m) 230 lb (104.3 kg)       General: Mild distress. Alert and Oriented. Appears stated age. HEENT: No midline cervical spine tenderness. Full ROM of the neck.  There is no significant cervical lympadenopathy. No difficulty tolerating oral secretions. Cardiac: Tachycardic and regular rhythm. Radial pulses are intact bilaterally. Chest: Mild respiratory distress. Clear breath sounds bilaterally. Increased work of breathing with use of accessory muscles for respiration. Abdomen: Soft, nontender, nondistended, non-peritonitic. Extremities: No significant lower extremity edema. Lower extremities are symmetric. Neuro: Moving all extremities. No focal deficits. Speech is clear. Skin: No rash, no erythema  Psych: Calm and cooperative. DIAGNOSTIC RESULTS   LABS:    Labs Reviewed   COMPREHENSIVE METABOLIC PANEL - Abnormal; Notable for the following components:       Result Value    Sodium 135 (*)     Potassium 5.6 (*)     Glucose 129 (*)     AST 56 (*)     All other components within normal limits   CBC WITH AUTO DIFFERENTIAL - Abnormal; Notable for the following components:    Hemoglobin 13.3 (*)     Hematocrit 39.1 (*)     All other components within normal limits   LACTATE, SEPSIS - Abnormal; Notable for the following components:    Lactic Acid, Sepsis 2.8 (*)     All other components within normal limits   LACTATE, SEPSIS - Abnormal; Notable for the following components:    Lactic Acid, Sepsis 3.0 (*)     All other components within normal limits   COMPREHENSIVE METABOLIC PANEL - Abnormal; Notable for the following components:    Glucose 131 (*)     Total Protein 6.1 (*)     All other components within normal limits   COVID-19 & INFLUENZA COMBO   TROPONIN   BLOOD GAS, VENOUS   LIPASE   D-DIMER, QUANTITATIVE   PROCALCITONIN       When ordered only abnormal lab results are displayed. All other labs were within normal range or not returned as of this dictation. EKG  The EKG, as interpreted by myself, in the emergency department in the absence of a cardiologist.  sinus tachycardia, vxnu=187    Axis is   Normal  QTc is   465  Intervals and Durations are unremarkable. Nonspecific T wave abnormalities, consider possible ischemia    RADIOLOGY:   Non-plain film images such as CT, Ultrasound and MRI are read by the radiologist. Plain radiographic images are visualized and preliminarily interpreted by the ED Provider with the below findings:    CXR no acute process  CT PE without evidence of saddle PE    Interpretation per the Radiologist below, if available at the time of this note:    CT CHEST PULMONARY EMBOLISM W CONTRAST   Final Result   No evidence of an acute pulmonary embolus. Minimal/mild tree-in-bud nodularity in the right upper lobe posterior segment   and right lower lobe superior segment, likely inflammatory/infectious. XR CHEST PORTABLE   Final Result   No acute process. No results found. Bedside Ultrasound, as interpreted by me, if performed:    No results found. PROCEDURES     Unless otherwise noted below, none     Procedures    CRITICAL CARE TIME     I personally spent a total of 15 minutes of critical care time in obtaining history, performing a physical exam, bedside monitoring of interventions, collecting and interpreting tests and discussion with consultants but excluding time spent performing procedures, treating other patients and teaching time. PAST MEDICAL HISTORY      has a past medical history of Asthma.      EMERGENCY DEPARTMENT COURSE and DIFFERENTIAL DIAGNOSIS/MDM:     Vitals:    Vitals:    03/03/23 2300 03/03/23 2330 03/03/23 2359 03/04/23 0023   BP: 118/68 129/80 138/76    Pulse: (!) 112 100 (!) 106 95   Resp: 19 14 16 30   Temp:       TempSrc:       SpO2: 97% 99% 96% 96%   Weight:       Height:           Patient was treated with and given the following medications:  Medications   ipratropium-albuterol (DUONEB) nebulizer solution 3 ampule (3 ampules Inhalation Given 3/3/23 9849)   0.9 % sodium chloride bolus (1,000 mLs IntraVENous New Bag 3/3/23 230)   iopamidol (ISOVUE-370) 76 % injection 75 mL (75 mLs IntraVENous Given 3/3/23 6534)             [unfilled]    CC/HPI Summary, DDx, ED Course, and Reassessment:     Jocelyn Arthur is a 41year old male with past medical history of hypertension, hyperlipidemia, asthma who presents to [facility] ED for evaluation of shortness of breath.     The differential diagnosis associated with the patient's presentation includes: COVID, influenza, ACS, PE, acute asthma exacerbation    Shortness of breath  - Increased work of breathing with normal SpO2 98%  - COVID/flu negative  - CMP, CBC, VBG unremarkable  - D dimer 0.37  - Lactate 3.0  - CXR negative for acute process  - Given duonebs in ED with improvement of symptoms  - CT PE ordered due to unexplained tachycardia and SOB   - Decision to admit for further workup and management    Tachycardia  - Up to 130s on exam  - EKG: Sinus tachycardia with nonspecific T wave changes  - Troponin negative  - CT PE ordered due to unexplained tachycardia, SOB, and recent car travel, no evidence of PE however    Minimal/mild tree-in-bud nodularity in the right upper lobe posterior segment  and right lower lobe superior segment, likely inflammatory/infectious  - Given 1 L NS in ED with some improvement in HR down to 95    CONSULTS: (Who and What was discussed)  IP CONSULT TO HOSPITALIST    Discussion with Other Professionals :      Management of the patient was discussed with Dr. Yesenia Bauer    Social Determinants : None    Patient's care impacted by chronic condition(s): hypertension not on medication, hyperlipidemia not on medication    Records Reviewed : None    Disposition Considerations (include 1 Tests not done, Shared Decision Making, Pt Expectation of Test or Tx.):     I considered management with po steroids but did not prescribe due to given course of steroids by [facility]    Decision to admit    Escalation of care including admission/observation considered due to tachycardia and persistent shortness of breath, elevated lactate without apparent cause. Shared decision making with Dr. Eladia Bennett was employed    I am the Primary Clinician of Record. FINAL IMPRESSION      1. Shortness of breath    2. Tachycardia    3. Electrocardiogram showing T wave abnormalities          DISPOSITION/PLAN     DISPOSITION Decision To Admit 03/03/2023 11:30:04 PM      PATIENT REFERRED TO:  No follow-up provider specified. DISCHARGE MEDICATIONS:  Patient was given scripts for the following medications. I counseled patient how to take these medications:  New Prescriptions    No medications on file       DISCONTINUED MEDICATIONS:  Discontinued Medications    No medications on file            (This chart was generated in part by using Dragon Dictation system and may contain errors related to that system including errors in grammar, punctuation, and spelling, as well as words and phrases that may be inappropriate.  If there are any questions or concerns please feel free to contact the dictating provider for clarification.)    Júnior Sears MD, PGY-1  [facility] Medicine Residency [facility]   3/4/2023      Júnior Sears MD  Resident  03/04/23 8139

## 2023-03-04 NOTE — ED NOTES
This RN called [facility]. RN unavailable to take report at this time. RN to call ER shortly.       Reid Jones RN  03/04/23 5248

## 2023-03-04 NOTE — ED PROVIDER NOTES
I independently examined and evaluated Brandon Gorssman. In brief, patient is a 60-year-old male presents to the emergency department for evaluation of cough and shortness of breath. Patient presents tachycardic to 112, satting 98% on room air. Coarse lung sounds on auscultation bilaterally. Negative Homans' sign bilaterally. Has inverted T waves on EKG, which is new compared to last EKG which was from 2014. Reports having persistent to worsening symptoms despite taking the prednisone and amoxicillin that was prescribed to him at the [address] [place] [address]. Has elevated lactic of 2.8. Blood specimen was hemolyzed but potassium 5.6. Will repeat CMP after liter of IV fluids. D-dimer negative. Lipase normal.  Troponin negative. Given the duration of symptoms, serial troponins not indicated at this time. COVID-negative. Influenza negative. Repeat lactic remains elevated at 3 after ivf. Repeat bmp improved with k 3.6. ct pe shows no acute PE. Minimal mild tree in bud nodularity which may be inflammatory or infectious. Patient reports taking amoxicillin and prednisone for several days with no change in symptoms. Given the tachycardia, inverted t waves new on ekg, and ct finding, Hospitalist consulted for admission. Remains tachycardic to 100s after IVF. Admit. The Ekg interpreted by me shows  Sinus tachycardia with a rate of 121  Axis is normal  ST Segments: Inverted T waves on inferior leads and leads V3 through V6, which is new compared to EKG from September 11, 2014    All diagnostic, treatment, and disposition decisions were made by myself in conjunction with the advanced practice provider/resident physician. I personally saw the patient and performed a substantive portion of the visit including aspects of the medical decision making. I personally saw the patient and independently provided 10 minutes of non-concurrent critical care out of the total shared critical care time provided.     Comment: Please note this report has been produced using speech recognition software and may contain errors related to that system including errors in grammar, punctuation, and spelling, as well as words and phrases that may be inappropriate. If there are any questions or concerns please feel free to contact the dictating provider for clarification. For all further details of the patient's emergency department visit, please see the advanced practice provider's documentation.        Kathy Hill MD  03/04/23 7661

## 2023-03-04 NOTE — ED NOTES
Pt given a mask and asked to wear it while staff is in the room. Pt verbalized understanding and placed mask on his face.       Jesica Quinones RN  03/03/23 0369

## 2023-03-04 NOTE — CONSULTS
INPATIENT PULMONARY CRITICAL CARE CONSULT NOTE      Chief Complaint/Referring Provider:  Patient is being seen at the request of Dr. Sammy Duque for a consultation for asthma exacerbation near syncope     Presenting HPI: Patient came to the hospital with cough/shortness of breath/syncope      As per admitting provider-Michael Kaya Agarwal is a 41 y.o. male. He presented to the ER for severe cough which was causing him to nearly lose consciousness. The patient recently traveled to [place] and back. About 6 days ago during his trip he developed upper respiratory symptoms. He described sinus congestion and post nasal drip. Over the course of several days he described a worsening cough, then chest congestion, wheezing, and finally dyspnea. He described severe coughing fits at times which almost caused him to lose consciousness. He sought care at a [address] [place] [address] 2 days prior to presenting. He was prescribed prednisone, albuterol, and augmentin. He has taken two doses of prednisone and three of augmentin so far. He has not improved significantly. He relates it seemed likely everyone in the family has become ill with similar symptoms. Patient relates he had asthma as a young child, but has not required any sort of treatment for it for decades.     Patient when seen states that he was in his usual state of health till about 2 weeks back when he started having URI-like symptoms along with that patient was having increasing cough and congestion and patient had gone to urgent care and patient was told that she has asthma and patient was given antibiotics steroids and inhaler without any significant improvement, patient states that he has been having intermittent coughing fits and patient states that he nearly passed out multiple times at work which made him come to the hospital, patient states that he was having some cough with mucopurulent expectoration before but patient does not have any expectoration now, patient was having shortness of breath and chest tightness, patient also has had some wheezing, patient denies any significant epistaxis or hemoptysis, no odynophagia or dysphagia, no significant fever or chills, patient did not have any increasing abdominal discomfort nausea vomiting, no skin rashes or allergic diathesis, patient does not have any increasing leg edema, patient does have a history of snoring, patient states that he was in the process of getting evaluated for sleep apnea, patient has 2 dogs as pets which are not new, no other changes and involvement, patient states that his son also had URI like symptoms and was hospitalized at [facility] and has been told that he also has asthma, patient does have a history of childhood asthma but he had outgrown that patient works in a factory which makes shampoo but patient states that there is no change in the process or ingredients at work, was alert and oriented, no other pertinent review of system of concern      Patient Active Problem List    Diagnosis Date Noted    Asthma exacerbation 03/04/2023    Asthma exacerbation attacks 03/04/2023    Pulmonary infiltrates 03/04/2023    Class 2 obesity in adult 03/04/2023    Observed sleep apnea 03/04/2023    Elevated lactic acid level 03/04/2023       Past Medical History:   Diagnosis Date    Asthma         History reviewed. No pertinent surgical history. History reviewed. No pertinent family history. Social History     Tobacco Use    Smoking status: Never    Smokeless tobacco: Never   Substance Use Topics    Alcohol use: Yes     Comment: Occasionally        No Known Allergies            Physical Exam:  Blood pressure (!) 142/97, pulse 99, temperature 98.2 °F (36.8 °C), temperature source Oral, resp. rate 20, height 5' 7\" (1.702 m), weight 224 lb 13.9 oz (102 kg), SpO2 96 %.'   Constitutional:  No acute distress. HENT:  Oropharynx is clear and moist. No thyromegaly.   Eyes:  Conjunctivae are normal. Pupils equal, round, and reactive to light. No scleral icterus. Neck: . No tracheal deviation present. No obvious thyroid mass. Short and large neck  Cardiovascular: Normal rate, regular rhythm, normal heart sounds. No right ventricular heave. No lower extremity edema. Pulmonary/Chest: Scattered wheezes. No rales. Chest wall is not dull to percussion. No accessory muscle usage or stridor. Prolonged expiration with decreased breath sound intensity  Abdominal: Soft. Bowel sounds present. No distension or hernia. No tenderness. Obese  Musculoskeletal: No cyanosis. No clubbing. No obvious joint deformity. Lymphadenopathy: No cervical or supraclavicular adenopathy. Skin: Skin is warm and dry. No rash or nodules on the exposed extremities. Psychiatric: Normal mood and affect. Behavior is normal.  No anxiety. Neurologic: Alert, awake and oriented. PERRL. Speech fluent        Results:  CBC:   Recent Labs     03/03/23 2227   WBC 6.9   HGB 13.3*   HCT 39.1*   MCV 92.9        BMP:   Recent Labs     03/03/23 2227 03/04/23  0000   * 137   K 5.6* 3.6   CL 99 101   CO2 26 24   BUN 12 12   CREATININE 0.9 0.9     LIVER PROFILE:   Recent Labs     03/03/23 2227 03/04/23  0000   AST 56* 31   ALT 38 34   LIPASE 35.0  --    BILITOT 0.3 0.3   ALKPHOS 77 80       Imaging:  I have reviewed radiology images personally. CT CHEST PULMONARY EMBOLISM W CONTRAST   Final Result   No evidence of an acute pulmonary embolus. Minimal/mild tree-in-bud nodularity in the right upper lobe posterior segment   and right lower lobe superior segment, likely inflammatory/infectious. XR CHEST PORTABLE   Final Result   No acute process.            XR CHEST PORTABLE    Result Date: 3/3/2023  EXAMINATION: ONE XRAY VIEW OF THE CHEST 3/3/2023 9:55 pm COMPARISON: 09/11/2014 HISTORY: ORDERING SYSTEM PROVIDED HISTORY: other TECHNOLOGIST PROVIDED HISTORY: Reason for exam:->other Reason for Exam: cp FINDINGS: The lungs and pleural spaces are without acute focal process. The cardiomediastinal silhouette is without acute process. There is no evidence of pneumothorax. The osseous structures are without acute process. No acute process. CT CHEST PULMONARY EMBOLISM W CONTRAST    Result Date: 3/4/2023  EXAMINATION: CTA OF THE CHEST 3/3/2023 11:46 pm TECHNIQUE: CTA of the chest was performed after the administration of intravenous contrast.  Multiplanar reformatted images are provided for review. MIP images are provided for review. Automated exposure control, iterative reconstruction, and/or weight based adjustment of the mA/kV was utilized to reduce the radiation dose to as low as reasonably achievable. COMPARISON: Chest x-ray 03/03/2023 HISTORY: ORDERING SYSTEM PROVIDED HISTORY: SOB w/ tachycardia TECHNOLOGIST PROVIDED HISTORY: Reason for exam:->SOB w/ tachycardia Decision Support Exception - unselect if not a suspected or confirmed emergency medical condition->Emergency Medical Condition (MA) Reason for Exam: sob and progressive cough x 1 week. worse tonight FINDINGS: Pulmonary Arteries: Pulmonary arteries are adequately opacified for evaluation. No evidence of intraluminal filling defect to suggest pulmonary embolism. Main pulmonary artery is normal in caliber. Mediastinum: No evidence of mediastinal lymphadenopathy. The heart and pericardium demonstrate no acute abnormality. There is no acute abnormality of the thoracic aorta. Lungs/pleura: There is minimal/mild tree-in-bud nodularity involving the posterior segment the right upper lobe and superior segment of the right lower lobe. Lungs are otherwise clear. No confluent airspace disease, pneumothorax or pleural effusion. Upper Abdomen: Fatty infiltration of the liver. Soft Tissues/Bones: No acute bone or soft tissue abnormality. No evidence of an acute pulmonary embolus.  Minimal/mild tree-in-bud nodularity in the right upper lobe posterior segment and right lower lobe superior segment, likely inflammatory/infectious. Latest Reference Range & Units 3/3/23 22:27 3/4/23 00:00   Lactic Acid, Sepsis 0.4 - 1.9 mmol/L 2.8 (H) 3.0 (H)   Glucose, Random 70 - 99 mg/dL 129 (H) 131 (H)   CALCIUM, SERUM, 363042 8.3 - 10.6 mg/dL 8.9 8.3   Total Protein 6.4 - 8.2 g/dL 6.9 6.1 (L)   Procalcitonin 0.00 - 0.15 ng/mL  0.17 (H)      Latest Reference Range & Units 3/3/23 22:27   INFLUENZA A NOT DETECTED  NOT DETECTED   INFLUENZA B NOT DETECTED  NOT DETECTED   SARS-CoV-2 RNA, RT PCR NOT DETECTED  NOT DETECTED     VBG-   Latest Reference Range & Units 3/3/23 22:27   O2 Therapy  Unknown   pH, Quoc 7.350 - 7.450  7.382   pCO2, Quoc 40.0 - 50.0 mmHg 44.8   pO2, Quoc 25.0 - 40.0 mmHg 37.5   HCO3, Venous 23.0 - 29.0 mmol/L 26.0   TC02 (Calc), Quoc Not Established mmol/L 27   Base Excess, Quoc -3.0 - 3.0 mmol/L 0.6   MetHgb, Quoc <1.5 % 0.6   O2 Sat, Quoc Not Established % 71       Echocardiogram:None in Epic  PFT:None in Epic        Assessment:  Principal Problem:    Asthma exacerbation attacks  Active Problems:    Asthma exacerbation    Pulmonary infiltrates    Class 2 obesity in adult    Observed sleep apnea    Elevated lactic acid level  Resolved Problems:    * No resolved hospital problems.  *          Plan:   Oxygen supplementation, if required, to keep saturation between 90 and 94% only  Patient was on room air oxygen when seen  Patient's ABG done earlier did not show patient having any signs of respiratory fatigue  Patient does have some pulm infiltrates on CT of the chest along with that patient has slightly elevated procalcitonin level and there is a possibility of patient having bronchiolitis  Patient also states that her son had URI with asthma for which he was hospitalized to hospital but patient does not know whether it was RSV or not  Patient is being given Zithromax by the admitting provider which can be continued  Will start the patient on [facility]  Patient does have lactic acid elevation which may be because of type B lactic acidosis  Will give 1 dose of magnesium sulfate and reassess  Patient does have a single reading of elevated blood pressure which needs to be trended and evaluated further if need be as per IM  Patient to be given DuoNeb on as needed basis  Patient may have had some cough syncope which needs to be monitored  Patient may benefit from a PFT as an outpatient  Patient also was in the process of getting sleep studies which he needs to pursue  Patient needs to lose weight  Regular regimented exercise to help  Zone 5 respiratory profile can be done if need be as an outpatient  PUD and DVT prophylaxis            Electronically signed by:  Nelson Villeda MD    3/4/2023    3:37 PM.

## 2023-03-04 NOTE — ED NOTES
3/4/23 3:35 AM  [phone] Hospital or Facility: [facility] From: Luz Wilson RE: Rodrigo Shine 1985 RM: 12-12 Pt c/o h/a. Tylenol?  Need Callback: NO CALLBACK REQ B3 TELEMETRY NEW ADMISSION  [address], RN  03/04/23 0115

## 2023-03-04 NOTE — RT PROTOCOL NOTE
RT Inhaler-Nebulizer Bronchodilator Protocol Note    There is a bronchodilator order in the chart from a provider indicating to follow the RT Bronchodilator Protocol and there is an Initiate RT Inhaler-Nebulizer Bronchodilator Protocol order as well (see protocol at bottom of note). CXR Findings:  XR CHEST PORTABLE    Result Date: 3/3/2023  No acute process. The findings from the last RT Protocol Assessment were as follows:   History Pulmonary Disease: None or smoker <15 pack years  Respiratory Pattern: Regular pattern and RR 12-20 bpm  Breath Sounds: Slightly diminished and/or crackles  Cough: Strong, spontaneous, non-productive  Indication for Bronchodilator Therapy: On home bronchodilators  Bronchodilator Assessment Score: 2    Aerosolized bronchodilator medication orders have been revised according to the RT Inhaler-Nebulizer Bronchodilator Protocol below. Respiratory Therapist to perform RT Therapy Protocol Assessment initially then follow the protocol. Repeat RT Therapy Protocol Assessment PRN for score 0-3 or on second treatment, BID, and PRN for scores above 3. No Indications - adjust the frequency to every 6 hours PRN wheezing or bronchospasm, if no treatments needed after 48 hours then discontinue using Per Protocol order mode. If indication present, adjust the RT bronchodilator orders based on the Bronchodilator Assessment Score as indicated below. Use Inhaler orders unless patient has one or more of the following: on home nebulizer, not able to hold breath for 10 seconds, is not alert and oriented, cannot activate and use MDI correctly, or respiratory rate 25 breaths per minute or more, then use the equivalent nebulizer order(s) with same Frequency and PRN reasons based on the score. If a patient is on this medication at home then do not decrease Frequency below that used at home.     0-3 - enter or revise RT bronchodilator order(s) to equivalent RT Bronchodilator order with Frequency of every 4 hours PRN for wheezing or increased work of breathing using Per Protocol order mode. 4-6 - enter or revise RT Bronchodilator order(s) to two equivalent RT bronchodilator orders with one order with BID Frequency and one order with Frequency of every 4 hours PRN wheezing or increased work of breathing using Per Protocol order mode. 7-10 - enter or revise RT Bronchodilator order(s) to two equivalent RT bronchodilator orders with one order with TID Frequency and one order with Frequency of every 4 hours PRN wheezing or increased work of breathing using Per Protocol order mode. 11-13 - enter or revise RT Bronchodilator order(s) to one equivalent RT bronchodilator order with QID Frequency and an Albuterol order with Frequency of every 4 hours PRN wheezing or increased work of breathing using Per Protocol order mode. Greater than 13 - enter or revise RT Bronchodilator order(s) to one equivalent RT bronchodilator order with every 4 hours Frequency and an Albuterol order with Frequency of every 2 hours PRN wheezing or increased work of breathing using Per Protocol order mode. RT to enter RT Home Evaluation for COPD & MDI Assessment order using Per Protocol order mode.     Electronically signed by Carlita Arriola on 3/4/2023 at 5:27 PM

## 2023-03-05 VITALS
TEMPERATURE: 98.1 F | WEIGHT: 232.7 LBS | HEIGHT: 67 IN | RESPIRATION RATE: 16 BRPM | BODY MASS INDEX: 36.52 KG/M2 | DIASTOLIC BLOOD PRESSURE: 92 MMHG | HEART RATE: 106 BPM | SYSTOLIC BLOOD PRESSURE: 159 MMHG | OXYGEN SATURATION: 95 %

## 2023-03-05 LAB
ANION GAP SERPL CALCULATED.3IONS-SCNC: 8 MMOL/L (ref 3–16)
BASOPHILS ABSOLUTE: 0 K/UL (ref 0–0.2)
BASOPHILS RELATIVE PERCENT: 0.2 %
BUN BLDV-MCNC: 12 MG/DL (ref 7–20)
CALCIUM SERPL-MCNC: 9.7 MG/DL (ref 8.3–10.6)
CHLORIDE BLD-SCNC: 100 MMOL/L (ref 99–110)
CO2: 28 MMOL/L (ref 21–32)
CREAT SERPL-MCNC: 0.8 MG/DL (ref 0.9–1.3)
EOSINOPHILS ABSOLUTE: 0 K/UL (ref 0–0.6)
EOSINOPHILS RELATIVE PERCENT: 0.1 %
GFR SERPL CREATININE-BSD FRML MDRD: >60 ML/MIN/{1.73_M2}
GLUCOSE BLD-MCNC: 120 MG/DL (ref 70–99)
HCT VFR BLD CALC: 44.4 % (ref 40.5–52.5)
HEMOGLOBIN: 14.8 G/DL (ref 13.5–17.5)
LACTIC ACID: 3.1 MMOL/L (ref 0.4–2)
LYMPHOCYTES ABSOLUTE: 1.6 K/UL (ref 1–5.1)
LYMPHOCYTES RELATIVE PERCENT: 18.9 %
MCH RBC QN AUTO: 31.4 PG (ref 26–34)
MCHC RBC AUTO-ENTMCNC: 33.2 G/DL (ref 31–36)
MCV RBC AUTO: 94.5 FL (ref 80–100)
MONOCYTES ABSOLUTE: 0.6 K/UL (ref 0–1.3)
MONOCYTES RELATIVE PERCENT: 7.7 %
NEUTROPHILS ABSOLUTE: 6 K/UL (ref 1.7–7.7)
NEUTROPHILS RELATIVE PERCENT: 73.1 %
PDW BLD-RTO: 12.7 % (ref 12.4–15.4)
PLATELET # BLD: 326 K/UL (ref 135–450)
PMV BLD AUTO: 8.6 FL (ref 5–10.5)
POTASSIUM SERPL-SCNC: 4.1 MMOL/L (ref 3.5–5.1)
RBC # BLD: 4.7 M/UL (ref 4.2–5.9)
SODIUM BLD-SCNC: 136 MMOL/L (ref 136–145)
WBC # BLD: 8.2 K/UL (ref 4–11)

## 2023-03-05 PROCEDURE — 83605 ASSAY OF LACTIC ACID: CPT

## 2023-03-05 PROCEDURE — 80048 BASIC METABOLIC PNL TOTAL CA: CPT

## 2023-03-05 PROCEDURE — 6370000000 HC RX 637 (ALT 250 FOR IP): Performed by: HOSPITALIST

## 2023-03-05 PROCEDURE — 6370000000 HC RX 637 (ALT 250 FOR IP): Performed by: INTERNAL MEDICINE

## 2023-03-05 PROCEDURE — 6360000002 HC RX W HCPCS: Performed by: INTERNAL MEDICINE

## 2023-03-05 PROCEDURE — 36415 COLL VENOUS BLD VENIPUNCTURE: CPT

## 2023-03-05 PROCEDURE — 94640 AIRWAY INHALATION TREATMENT: CPT

## 2023-03-05 PROCEDURE — 99232 SBSQ HOSP IP/OBS MODERATE 35: CPT | Performed by: INTERNAL MEDICINE

## 2023-03-05 PROCEDURE — 85025 COMPLETE CBC W/AUTO DIFF WBC: CPT

## 2023-03-05 RX ORDER — AZITHROMYCIN 250 MG/1
250 TABLET, FILM COATED ORAL DAILY
Qty: 3 TABLET | Refills: 0 | Status: SHIPPED | OUTPATIENT
Start: 2023-03-06 | End: 2023-03-09

## 2023-03-05 RX ORDER — PREDNISONE 20 MG/1
40 TABLET ORAL DAILY
Status: DISCONTINUED | OUTPATIENT
Start: 2023-03-05 | End: 2023-03-05 | Stop reason: HOSPADM

## 2023-03-05 RX ORDER — PREDNISONE 20 MG/1
40 TABLET ORAL DAILY
Qty: 10 TABLET | Refills: 0 | Status: SHIPPED | OUTPATIENT
Start: 2023-03-06 | End: 2023-03-11

## 2023-03-05 RX ORDER — BENZONATATE 200 MG/1
200 CAPSULE ORAL 3 TIMES DAILY PRN
Qty: 21 CAPSULE | Refills: 0 | Status: SHIPPED | OUTPATIENT
Start: 2023-03-05 | End: 2023-03-12

## 2023-03-05 RX ADMIN — ENOXAPARIN SODIUM 40 MG: 100 INJECTION SUBCUTANEOUS at 09:16

## 2023-03-05 RX ADMIN — METHYLPREDNISOLONE SODIUM SUCCINATE 40 MG: 40 INJECTION, POWDER, FOR SOLUTION INTRAMUSCULAR; INTRAVENOUS at 04:03

## 2023-03-05 RX ADMIN — Medication 2 PUFF: at 08:00

## 2023-03-05 RX ADMIN — PANTOPRAZOLE SODIUM 40 MG: 40 TABLET, DELAYED RELEASE ORAL at 05:07

## 2023-03-05 RX ADMIN — PREDNISONE 40 MG: 20 TABLET ORAL at 12:27

## 2023-03-05 RX ADMIN — AZITHROMYCIN MONOHYDRATE 250 MG: 250 TABLET ORAL at 09:16

## 2023-03-05 RX ADMIN — BENZONATATE 200 MG: 100 CAPSULE ORAL at 05:10

## 2023-03-05 NOTE — PROGRESS NOTES
INPATIENT PULMONARY CRITICAL CARE PROGRESS NOTE      Reason for visit     asthma exacerbation near syncope    SUBJECTIVE: Patient when seen this morning was feeling better, patient was not having the shortness of breath, no significant chest tightness or any congestion, no wheezing, patient was not having significant nasal congestion, patient was afebrile and hemodynamically maintained, patient's blood pressure was better as compared to yesterday, patient was not hypoxemic and was on room air oxygen was saturating 97% when evaluated, no other pertinent review of system of concern             Physical Exam:  Blood pressure (!) 144/86, pulse 89, temperature 98 °F (36.7 °C), resp. rate 16, height 5' 7\" (1.702 m), weight 232 lb 11.2 oz (105.6 kg), SpO2 97 %.'     Constitutional:  No acute distress. HENT:  Oropharynx is clear and moist. No thyromegaly. Eyes:  Conjunctivae are normal. Pupils equal, round, and reactive to light. No scleral icterus. Neck: . No tracheal deviation present. No obvious thyroid mass. Short and large neck  Cardiovascular: Normal rate, regular rhythm, normal heart sounds. No right ventricular heave. No lower extremity edema. Pulmonary/Chest: No significant wheezes. No rales. Chest wall is not dull to percussion. No accessory muscle usage or stridor. Prolonged expiration with decreased breath sound intensity  Abdominal: Soft. Bowel sounds present. No distension or hernia. No tenderness. Obese  Musculoskeletal: No cyanosis. No clubbing. No obvious joint deformity. Lymphadenopathy: No cervical or supraclavicular adenopathy. Skin: Skin is warm and dry. No rash or nodules on the exposed extremities. Psychiatric: Normal mood and affect. Behavior is normal.  No anxiety. Neurologic: Alert, awake and oriented. PERRL.   Speech fluent          Results:  CBC:   Recent Labs     03/03/23  2227 03/05/23  0445   WBC 6.9 8.2   HGB 13.3* 14.8   HCT 39.1* 44.4   MCV 92.9 94.5    326 BMP:   Recent Labs     03/03/23 2227 03/04/23  0000 03/05/23  0445   * 137 136   K 5.6* 3.6 4.1   CL 99 101 100   CO2 26 24 28   BUN 12 12 12   CREATININE 0.9 0.9 0.8*     LIVER PROFILE:   Recent Labs     03/03/23 2227 03/04/23  0000   AST 56* 31   ALT 38 34   LIPASE 35.0  --    BILITOT 0.3 0.3   ALKPHOS 77 80         Imaging:  I have reviewed radiology images personally. CT CHEST PULMONARY EMBOLISM W CONTRAST   Final Result   No evidence of an acute pulmonary embolus. Minimal/mild tree-in-bud nodularity in the right upper lobe posterior segment   and right lower lobe superior segment, likely inflammatory/infectious. XR CHEST PORTABLE   Final Result   No acute process. XR CHEST PORTABLE    Result Date: 3/3/2023  EXAMINATION: ONE XRAY VIEW OF THE CHEST 3/3/2023 9:55 pm COMPARISON: 09/11/2014 HISTORY: ORDERING SYSTEM PROVIDED HISTORY: other TECHNOLOGIST PROVIDED HISTORY: Reason for exam:->other Reason for Exam: cp FINDINGS: The lungs and pleural spaces are without acute focal process. The cardiomediastinal silhouette is without acute process. There is no evidence of pneumothorax. The osseous structures are without acute process. No acute process. CT CHEST PULMONARY EMBOLISM W CONTRAST    Result Date: 3/4/2023  EXAMINATION: CTA OF THE CHEST 3/3/2023 11:46 pm TECHNIQUE: CTA of the chest was performed after the administration of intravenous contrast.  Multiplanar reformatted images are provided for review. MIP images are provided for review. Automated exposure control, iterative reconstruction, and/or weight based adjustment of the mA/kV was utilized to reduce the radiation dose to as low as reasonably achievable.  COMPARISON: Chest x-ray 03/03/2023 HISTORY: ORDERING SYSTEM PROVIDED HISTORY: SOB w/ tachycardia TECHNOLOGIST PROVIDED HISTORY: Reason for exam:->SOB w/ tachycardia Decision Support Exception - unselect if not a suspected or confirmed emergency medical condition->Emergency Medical Condition (MA) Reason for Exam: sob and progressive cough x 1 week. worse tonight FINDINGS: Pulmonary Arteries: Pulmonary arteries are adequately opacified for evaluation. No evidence of intraluminal filling defect to suggest pulmonary embolism. Main pulmonary artery is normal in caliber. Mediastinum: No evidence of mediastinal lymphadenopathy. The heart and pericardium demonstrate no acute abnormality. There is no acute abnormality of the thoracic aorta. Lungs/pleura: There is minimal/mild tree-in-bud nodularity involving the posterior segment the right upper lobe and superior segment of the right lower lobe. Lungs are otherwise clear. No confluent airspace disease, pneumothorax or pleural effusion. Upper Abdomen: Fatty infiltration of the liver. Soft Tissues/Bones: No acute bone or soft tissue abnormality. No evidence of an acute pulmonary embolus. Minimal/mild tree-in-bud nodularity in the right upper lobe posterior segment and right lower lobe superior segment, likely inflammatory/infectious. Assessment:  Principal Problem:    Asthma exacerbation attacks  Active Problems:    Asthma exacerbation    Pulmonary infiltrates    Class 2 obesity in adult    Observed sleep apnea    Elevated lactic acid level    Elevated blood pressure reading  Resolved Problems:    * No resolved hospital problems.  *          Plan:   Oxygen supplementation, if required, to keep saturation between 90 and 94% only  Patient was on room air oxygen when seen  Patient's ABG done earlier did not show patient having any signs of respiratory fatigue  Patient does have some pulm infiltrates on CT of the chest along with that patient has slightly elevated procalcitonin level and there is a possibility of patient having bronchiolitis  Patient also states that her son had URI with asthma for which he was hospitalized to hospital but patient does not know whether it was RSV or not  Patient is being given Zithromax by the admitting provider which can be continued for total of 5 days  Continue status post magnesium sulfate Dulera  Patient does have lactic acid elevation which may be because of type B lactic acidosis  Will give 1 dose of magnesium sulfate and reassess  Oxygen supplementation, if required, to keep saturation between 90 and 94% only  Patient was on room air oxygen when seen  Patient's ABG done earlier did not show patient having any signs of respiratory fatigue  Patient does have some pulm infiltrates on CT of the chest along with that patient has slightly elevated procalcitonin level and there is a possibility of patient having bronchiolitis  Patient also states that her son had URI with asthma for which he was hospitalized to hospital but patient does not know whether it was RSV or not  Patient is being given Zithromax by the admitting provider which can be continued  Continue [facility]  Patient got magnesium sulfate yesterday  Patient to take albuterol inhaler on as needed basis  No other episode of cough syncope patient may have had some cough syncope which needs to be monitored  Patient may benefit from a PFT as an outpatient  Patient also was in the process of getting sleep studies which he needs to pursue-patient was supposed to see Dr. Bob Ray came to the room pulmonology department if need be and patient can decide upon that  Patient needs to lose weight  Regular regimented exercise to help  Zone 5 respiratory profile can be done if need be as an outpatient  PUD and DVT prophylaxis to take albuterol on as needed basis  Patient to be given DuoNeb on as needed basis  Patient may benefit from a PFT as an outpatient  Patient needs to lose weight  Regular regimented exercise to help  Zone 5 respiratory profile can be done if need be as an outpatient  PUD and DVT prophylaxis    ? Discharge planning     Case d/w patient,nursing and Internal medicine team            Electronically signed by: Felice Sotomayor MD    3/5/2023    8:12 AM.

## 2023-03-05 NOTE — PROGRESS NOTES
Hospitalist Progress Note      PCP: MJ Roman - NP    Date of Admission: 3/3/2023    Chief Complaint: Shortness of breath and cough       Hospital Course: This 81-year-old male with a history of asthma recently traveled to [place] and back admitted with shortness of breath and cough causing him to nearly lose consciousness failed outpatient treatment with the prednisone, Augmentin, albuterol inhaler. Subjective: Patient is lying in the bed denies any chest pain no shortness of breath no nausea vomiting currently on room air. Medications:  Reviewed    Infusion Medications   Scheduled Medications    methylPREDNISolone  40 mg IntraVENous Q12H    azithromycin  250 mg Oral Daily    enoxaparin  40 mg SubCUTAneous Daily    pantoprazole  40 mg Oral QAM AC    mometasone-formoterol  2 puff Inhalation BID     PRN Meds: melatonin, calcium carbonate, acetaminophen, acetaminophen, benzonatate, albuterol sulfate HFA      Intake/Output Summary (Last 24 hours) at 3/5/2023 0918  Last data filed at 3/5/2023 6822  Gross per 24 hour   Intake 0 ml   Output --   Net 0 ml       Physical Exam Performed:    BP (!) 144/86   Pulse 89   Temp 98 °F (36.7 °C)   Resp 16   Ht 5' 7\" (1.702 m)   Wt 232 lb 11.2 oz (105.6 kg)   SpO2 97%   BMI 36.45 kg/m²     General appearance: No apparent distress, appears stated age and cooperative. HEENT: Pupils equal, round, and reactive to light. Conjunctivae/corneas clear. Neck: Supple, with full range of motion. No jugular venous distention. Trachea midline. Respiratory:  Normal respiratory effort. Clear to auscultation, bilaterally without Rales/Wheezes/Rhonchi. Cardiovascular: Regular rate and rhythm with normal S1/S2 without murmurs, rubs or gallops. Abdomen: Soft, non-tender, non-distended with normal bowel sounds. Musculoskeletal: No clubbing, cyanosis or edema bilaterally. Full range of motion without deformity.   Skin: Skin color, texture, turgor normal.  No rashes or lesions. Neurologic:  Neurovascularly intact without any focal sensory/motor deficits. Cranial nerves: II-XII intact, grossly non-focal.  Psychiatric: Alert and oriented, thought content appropriate, normal insight  Capillary Refill: Brisk, 3 seconds, normal   Peripheral Pulses: +2 palpable, equal bilaterally       Labs:   Recent Labs     03/03/23 2227 03/05/23  0445   WBC 6.9 8.2   HGB 13.3* 14.8   HCT 39.1* 44.4    326     Recent Labs     03/03/23 2227 03/04/23  0000 03/05/23  0445   * 137 136   K 5.6* 3.6 4.1   CL 99 101 100   CO2 26 24 28   BUN 12 12 12   CREATININE 0.9 0.9 0.8*   CALCIUM 8.9 8.3 9.7     Recent Labs     03/03/23 2227 03/04/23  0000   AST 56* 31   ALT 38 34   BILITOT 0.3 0.3   ALKPHOS 77 80     No results for input(s): INR in the last 72 hours. Recent Labs     03/03/23 2227   TROPONINI <0.01       Urinalysis:    No results found for: Carilyn Parcel, BACTERIA, RBCUA, BLOODU, SPECGRAV, GLUCOSEU    Radiology:  CT CHEST PULMONARY EMBOLISM W CONTRAST   Final Result   No evidence of an acute pulmonary embolus. Minimal/mild tree-in-bud nodularity in the right upper lobe posterior segment   and right lower lobe superior segment, likely inflammatory/infectious. XR CHEST PORTABLE   Final Result   No acute process.              IP CONSULT TO HOSPITALIST  IP CONSULT TO PULMONOLOGY    Assessment/Plan:    Active Hospital Problems    Diagnosis     Asthma exacerbation [J45.901]      Priority: Medium    Asthma exacerbation attacks [J45.901]      Priority: Medium    Pulmonary infiltrates [R91.8]      Priority: Medium    Class 2 obesity in adult [E66.9]      Priority: Medium    Observed sleep apnea [G47.30]      Priority: Medium    Elevated lactic acid level [R79.89]      Priority: Medium    Elevated blood pressure reading [R03.0]      Priority: Medium     This 41-year-old male admitted with asthma exacerbation treated with IV steroids, Zithromax pulmonary consult appreciated per pulmonary okay to discharge home we will discontinue IV steroids and start him on a p.o. prednisone repeat lactic acid is still elevated patient no evidence of sepsis we will discharge patient home on a p.o. prednisone and Zithromax patient to follow-up with the PCP within a week patient to follow-up with pulmonary at [facility] as instructed. DVT Prophylaxis: Lovenox subcu  Diet: ADULT DIET;  Regular  Code Status: Full Code  PT/OT Eval Status:     Dispo -     Appropriate for A1 Discharge Unit: Lily Lynne MD

## 2023-03-05 NOTE — PLAN OF CARE
Symptoms resolved. Feels ready to go home.   Problem: Discharge Planning  Goal: Discharge to home or other facility with appropriate resources  3/5/2023 1050 by Db Valle RN  Outcome: Adequate for Discharge  3/5/2023 0029 by Jacque Delacruz RN  Outcome: Progressing     Problem: Pain  Goal: Verbalizes/displays adequate comfort level or baseline comfort level  3/5/2023 1050 by Db Valle RN  Outcome: Adequate for Discharge  3/5/2023 0029 by Jacque Delacruz RN  Outcome: Progressing

## 2023-03-05 NOTE — DISCHARGE SUMMARY
Hospital Medicine Discharge Summary    Patient ID: Jada Lindsay      Patient's PCP: MJ Rutledge NP    Admit Date: 3/3/2023     Discharge Date: 3/5/2023      Admitting Provider: Tim Brice MD     Discharge Provider: Carlos Huizar MD     Discharge Diagnoses: Active Hospital Problems    Diagnosis     Asthma exacerbation [J45.901]      Priority: Medium    Asthma exacerbation attacks [J45.901]      Priority: Medium    Pulmonary infiltrates [R91.8]      Priority: Medium    Class 2 obesity in adult [E66.9]      Priority: Medium    Observed sleep apnea [G47.30]      Priority: Medium    Elevated lactic acid level [R79.89]      Priority: Medium    Elevated blood pressure reading [R03.0]      Priority: Medium       The patient was seen and examined on day of discharge and this discharge summary is in conjunction with any daily progress note from day of discharge. Hospital Course: This 81-year-old male admitted with asthma exacerbation treated with IV steroids, Zithromax pulmonary consult appreciated per pulmonary okay to discharge home we will discontinue IV steroids and start him on a p.o. prednisone repeat lactic acid is still elevated patient no evidence of sepsis we will discharge patient home on a p.o. prednisone and Zithromax patient to follow-up with the PCP within a week patient to follow-up with pulmonary at [facility] as instructed. Physical Exam Performed:     BP (!) 159/92   Pulse (!) 106   Temp 98.1 °F (36.7 °C) (Oral)   Resp 16   Ht 5' 7\" (1.702 m)   Wt 232 lb 11.2 oz (105.6 kg)   SpO2 95%   BMI 36.45 kg/m²       General appearance:  No apparent distress, appears stated age and cooperative. HEENT:  Normal cephalic, atraumatic without obvious deformity. Pupils equal, round, and reactive to light. Extra ocular muscles intact. Conjunctivae/corneas clear. Neck: Supple, with full range of motion. No jugular venous distention.  Trachea midline. Respiratory:  Normal respiratory effort. Clear to auscultation, bilaterally without Rales/Wheezes/Rhonchi. Cardiovascular:  Regular rate and rhythm with normal S1/S2 without murmurs, rubs or gallops. Abdomen: Soft, non-tender, non-distended with normal bowel sounds. Musculoskeletal:  No clubbing, cyanosis or edema bilaterally. Full range of motion without deformity. Skin: Skin color, texture, turgor normal.  No rashes or lesions. Neurologic:  Neurovascularly intact without any focal sensory/motor deficits. Cranial nerves: II-XII intact, grossly non-focal.  Psychiatric:  Alert and oriented, thought content appropriate, normal insight  Capillary Refill: Brisk,< 3 seconds   Peripheral Pulses: +2 palpable, equal bilaterally       Labs: For convenience and continuity at follow-up the following most recent labs are provided:      CBC:    Lab Results   Component Value Date/Time    WBC 8.2 03/05/2023 04:45 AM    HGB 14.8 03/05/2023 04:45 AM    HCT 44.4 03/05/2023 04:45 AM     03/05/2023 04:45 AM       Renal:    Lab Results   Component Value Date/Time     03/05/2023 04:45 AM    K 4.1 03/05/2023 04:45 AM     03/05/2023 04:45 AM    CO2 28 03/05/2023 04:45 AM    BUN 12 03/05/2023 04:45 AM    CREATININE 0.8 03/05/2023 04:45 AM    CALCIUM 9.7 03/05/2023 04:45 AM         Significant Diagnostic Studies    Radiology:   CT CHEST PULMONARY EMBOLISM W CONTRAST   Final Result   No evidence of an acute pulmonary embolus. Minimal/mild tree-in-bud nodularity in the right upper lobe posterior segment   and right lower lobe superior segment, likely inflammatory/infectious. XR CHEST PORTABLE   Final Result   No acute process. Consults:     IP CONSULT TO HOSPITALIST  IP CONSULT TO PULMONOLOGY    Disposition: Home    Condition at Discharge: Stable    Discharge Instructions/Follow-up: Follow-up with PCP within week, pulmonary as instructed.     Code Status:  Full Code     Activity: activity as tolerated    Diet: regular diet      Discharge Medications:     Discharge Medication List as of 3/5/2023  2:28 PM             Details   azithromycin (ZITHROMAX) 250 MG tablet Take 1 tablet by mouth daily for 3 doses, Disp-3 tablet, R-0Print      benzonatate (TESSALON) 200 MG capsule Take 1 capsule by mouth 3 times daily as needed for Cough, Disp-21 capsule, R-0Print                Details   predniSONE (DELTASONE) 20 MG tablet Take 2 tablets by mouth daily for 5 days, Disp-10 tablet, R-0Print                Details   albuterol sulfate HFA (PROVENTIL;VENTOLIN;PROAIR) 108 (90 Base) MCG/ACT inhaler Historical Med             Time Spent on discharge: 45 minutes in the examination, evaluation, counseling and review of medications and discharge plan.      Signed:    MARCY HINSON MD   3/5/2023      Thank you MJ De Los Santos - MOOSE for the opportunity to be involved in this patient's care. If you have any questions or concerns, please feel free to contact me at [phone].

## 2023-03-05 NOTE — FLOWSHEET NOTE
03/04/23 1933   Vital Signs   Temp 98.2 °F (36.8 °C)   Temp Source Oral   Heart Rate 91   Heart Rate Source Monitor   Resp 18   /76   MAP (Calculated) 95   BP Location Right upper arm   BP Method Automatic   Patient Position Semi fowlers   Pain Assessment   Pain Assessment None - Denies Pain   Oxygen Therapy   SpO2 96 %   O2 Device None (Room air)

## 2023-04-27 ENCOUNTER — OFFICE VISIT (OUTPATIENT)
Dept: PULMONOLOGY | Age: 38
End: 2023-04-27
Payer: COMMERCIAL

## 2023-04-27 VITALS
DIASTOLIC BLOOD PRESSURE: 80 MMHG | HEART RATE: 83 BPM | SYSTOLIC BLOOD PRESSURE: 121 MMHG | HEIGHT: 67 IN | WEIGHT: 232 LBS | OXYGEN SATURATION: 97 % | BODY MASS INDEX: 36.41 KG/M2

## 2023-04-27 DIAGNOSIS — R06.83 SNORING: ICD-10-CM

## 2023-04-27 DIAGNOSIS — G47.30 OBSERVED SLEEP APNEA: ICD-10-CM

## 2023-04-27 DIAGNOSIS — E66.9 OBESITY, UNSPECIFIED CLASSIFICATION, UNSPECIFIED OBESITY TYPE, UNSPECIFIED WHETHER SERIOUS COMORBIDITY PRESENT: ICD-10-CM

## 2023-04-27 DIAGNOSIS — R53.83 FATIGUE, UNSPECIFIED TYPE: ICD-10-CM

## 2023-04-27 DIAGNOSIS — G47.10 HYPERSOMNIA: Primary | ICD-10-CM

## 2023-04-27 PROCEDURE — 99204 OFFICE O/P NEW MOD 45 MIN: CPT | Performed by: INTERNAL MEDICINE

## 2023-04-27 ASSESSMENT — SLEEP AND FATIGUE QUESTIONNAIRES
ESS TOTAL SCORE: 9
NECK CIRCUMFERENCE (INCHES): 18.25
HOW LIKELY ARE YOU TO NOD OFF OR FALL ASLEEP WHILE SITTING AND TALKING TO SOMEONE: 0
HOW LIKELY ARE YOU TO NOD OFF OR FALL ASLEEP WHILE WATCHING TV: 2
HOW LIKELY ARE YOU TO NOD OFF OR FALL ASLEEP WHEN YOU ARE A PASSENGER IN A CAR FOR AN HOUR WITHOUT A BREAK: 0
HOW LIKELY ARE YOU TO NOD OFF OR FALL ASLEEP WHILE LYING DOWN TO REST IN THE AFTERNOON WHEN CIRCUMSTANCES PERMIT: 2
HOW LIKELY ARE YOU TO NOD OFF OR FALL ASLEEP WHILE SITTING INACTIVE IN A PUBLIC PLACE: 0
HOW LIKELY ARE YOU TO NOD OFF OR FALL ASLEEP WHILE SITTING AND READING: 3
HOW LIKELY ARE YOU TO NOD OFF OR FALL ASLEEP IN A CAR, WHILE STOPPED FOR A FEW MINUTES IN TRAFFIC: 0
HOW LIKELY ARE YOU TO NOD OFF OR FALL ASLEEP WHILE SITTING QUIETLY AFTER LUNCH WITHOUT ALCOHOL: 2

## 2023-04-27 NOTE — PROGRESS NOTES
[facility]                                                                  CHIEF COMPLAINT: Snoring/Nonrestorative sleep    Consulting provider: MJ Cortes NP      HPI:   Snoring at night for the past 5 years. The severity of snoring is severe. Worse in supine position and better on the side. Has observed sleep apnea. Wakes up at night choking and gasping for air. No restorative sleep. + dry mouth upon awakening. Patient is complaining of daytime sleepiness, fatigue and tiredness during the day. Bedtime 2 am and rise time is 7:30 am. Sleep onset 30 minutes. 2-3 naps/week for 1 hr. No headache in am. No car wrecks or near wrecks because of the sleepiness. No nodding off while driving. Asthma uses Albuterol and Flovent. Old records were reviewed and summarized by me. Past Medical History:   Diagnosis Date    Asthma        Past Surgical History:    No past surgical history on file. Allergies:  has No Known Allergies. Social History:    TOBACCO:   reports that he has never smoked. He has never used smokeless tobacco.  ETOH:   reports current alcohol use.       Family History:   Uncle with XI       Current Medications:    Current Outpatient Medications:     albuterol sulfate HFA (PROVENTIL;VENTOLIN;PROAIR) 108 (90 Base) MCG/ACT inhaler, , Disp: , Rfl:     mometasone-formoterol (DULERA) 100-5 MCG/ACT inhaler, Inhale 2 puffs into the lungs in the morning and 2 puffs in the evening., Disp: 1 each, Rfl: 1      REVIEW OF SYSTEMS:  Constitutional: Negative for fever  HENT: Negative for sore throat  Eyes: Negative for redness   Respiratory: Negative for dyspnea, cough  Cardiovascular: Negative for chest pain  Gastrointestinal: Negative for vomiting, diarrhea   Genitourinary: Negative for hematuria   Musculoskeletal:No arthralgias   Skin: Negative for rash  Neurological: Negative for syncope  Hematological: Negative for

## 2023-04-27 NOTE — PATIENT INSTRUCTIONS
[facility] [phone]    Sleep Hygiene. .. Tips for better sleep. .. Avoid naps. This will ensure you are sleepy at bedtime. If you have to take a nap, sleep less than 1 hour, before 3 pm.  Sleep only when sleepy; this reduces the time you are awake in bed. Regular exercise is recommended to help you deepen your sleep, but not within 4-6 hours of your bedtime. Timing of exercise is important, aim to exercise early in the morning or early afternoon. A light snack may help you fall asleep. Warm milk and foods high in the amino acid tryptophan, such as bananas, may help you to sleep  Be sure to avoid heavy, spicy or sugary foods 4-6 hours before bedtime and avoid at snack time. Stay away from stimulants such as caffeine and nicotine for at least 4-6 hours before bed. Stimulants can interfere with your ability to fall asleep. Caffeine is found in tea, cola, coffee, cocoa and chocolate and is best avoided at bedtime. Nicotine is found in tobacco products. Avoid alcohol 4-6 hours before bedtime. Alcohol has an immediate sleep-inducing effect, after a few hours when alcohol levels fall there is a stimulant or wake-up effect and will cause fragmented sleep. Sleep rituals are important. Give your body clues it is time to slow down and sleep. Examples include; yoga, deep breathing, listen to relaxing music, a hot bath or a few minutes of reading. Have a fixed bedtime and awakening time, Even on weekends! You will feel better keeping a regular sleep cycle, even if you are retired or not working. Get into your favorite sleep position. If not asleep in 30 minutes, get up and do something boring until you feel sleepy. Remember not to expose yourself to bright lights such as TV, phone or tablet screens. Only use your bed for sleeping. Do not use your bed as an office, workroom or recreation room. Use comfortable bedding. Uncomfortable bedding can prevent good sleep.   Ensure your bedroom is quiet and

## 2023-06-07 ENCOUNTER — HOSPITAL ENCOUNTER (OUTPATIENT)
Dept: SLEEP CENTER | Age: 38
Discharge: HOME | End: 2023-06-09
Payer: COMMERCIAL

## 2023-06-07 DIAGNOSIS — G47.30 OBSERVED SLEEP APNEA: ICD-10-CM

## 2023-06-07 DIAGNOSIS — E66.9 OBESITY, UNSPECIFIED CLASSIFICATION, UNSPECIFIED OBESITY TYPE, UNSPECIFIED WHETHER SERIOUS COMORBIDITY PRESENT: ICD-10-CM

## 2023-06-07 DIAGNOSIS — G47.10 HYPERSOMNIA: ICD-10-CM

## 2023-06-07 DIAGNOSIS — R06.83 SNORING: ICD-10-CM

## 2023-06-07 DIAGNOSIS — R53.83 FATIGUE, UNSPECIFIED TYPE: ICD-10-CM

## 2023-06-07 PROCEDURE — 95810 POLYSOM 6/> YRS 4/> PARAM: CPT

## 2023-06-08 PROBLEM — R06.83 SNORING: Status: ACTIVE | Noted: 2023-06-08

## 2023-06-08 PROBLEM — E66.9 OBESITY: Status: ACTIVE | Noted: 2023-06-08

## 2023-06-08 PROBLEM — R53.83 FATIGUE: Status: ACTIVE | Noted: 2023-06-08

## 2023-06-08 PROBLEM — G47.10 HYPERSOMNIA: Status: ACTIVE | Noted: 2023-06-08

## 2023-06-09 ENCOUNTER — TELEPHONE (OUTPATIENT)
Dept: PULMONOLOGY | Age: 38
End: 2023-06-09

## 2023-06-09 DIAGNOSIS — G47.33 SEVERE OBSTRUCTIVE SLEEP APNEA: Primary | ICD-10-CM

## 2023-07-05 ENCOUNTER — HOSPITAL ENCOUNTER (OUTPATIENT)
Dept: SLEEP CENTER | Age: 38
Discharge: HOME | End: 2023-07-07
Payer: COMMERCIAL

## 2023-07-05 DIAGNOSIS — G47.33 SEVERE OBSTRUCTIVE SLEEP APNEA: ICD-10-CM

## 2023-07-05 PROCEDURE — 95811 POLYSOM 6/>YRS CPAP 4/> PARM: CPT

## 2023-07-06 PROBLEM — G47.33 SEVERE OBSTRUCTIVE SLEEP APNEA: Status: ACTIVE | Noted: 2023-07-06

## 2023-07-10 ENCOUNTER — TELEPHONE (OUTPATIENT)
Dept: PULMONOLOGY | Age: 38
End: 2023-07-10

## 2023-07-10 DIAGNOSIS — G47.33 SEVERE OBSTRUCTIVE SLEEP APNEA: Primary | ICD-10-CM

## 2023-07-11 NOTE — TELEPHONE ENCOUNTER
Pt LM on VM requesting to change DME due to bad reviews. I called patient back he would like orders faxed to [facility] . I printed and faxed OV,testing,orders,demographics as requested.

## 2023-07-24 ENCOUNTER — TELEPHONE (OUTPATIENT)
Dept: PULMONOLOGY | Age: 38
End: 2023-07-24

## 2023-07-24 NOTE — TELEPHONE ENCOUNTER
Patient LVM to cancel appointment on 23 with Dr. Cordelia Gallagher for 31-90 day (?has pt been set up long enough?). Reason: has a  and can't make it    Patient did not reschedule appointment. Appointment rescheduled for . Last OV 23:    Assessment:       Hypersomnia and fatigue. DDx XI, narcolepsy, idiopathic hypersomnia, parasomnia. Overall history is suggesting XI. Snoring and Observed sleep apnea   Obesity class  Asthma on IBD- followed by PCP        Plan:       PSG evaluate for sleep related breathing disorder. Treatment options were discussed with patient if PSG reveals XI, including CPAP therapy, oral appliances, upper airway surgery and hypoglossal nerve stimulation. Discussed with patient the pathophysiology of apnea. Sleep hygiene  Avoid sedatives, alcohol and caffeinated drinks at bed time. No driving motorized vehicles or operating heavy machinery while fatigue, drowsy or sleepy. Weight loss is also recommended as a long-term intervention. Complications of XI if not treated were discussed with patient patient to include systemic hypertension, pulmonary hypertension, cardiovascular morbidities, car accidents and all cause mortality.    Continue IBD - prefers to follow up with PCP

## 2023-09-06 ENCOUNTER — OFFICE VISIT (OUTPATIENT)
Dept: PULMONOLOGY | Age: 38
End: 2023-09-06
Payer: COMMERCIAL

## 2023-09-06 VITALS
WEIGHT: 241.8 LBS | HEIGHT: 67 IN | HEART RATE: 78 BPM | OXYGEN SATURATION: 98 % | SYSTOLIC BLOOD PRESSURE: 104 MMHG | BODY MASS INDEX: 37.95 KG/M2 | DIASTOLIC BLOOD PRESSURE: 76 MMHG

## 2023-09-06 DIAGNOSIS — J45.909 ASTHMA, UNSPECIFIED ASTHMA SEVERITY, UNSPECIFIED WHETHER COMPLICATED, UNSPECIFIED WHETHER PERSISTENT: ICD-10-CM

## 2023-09-06 DIAGNOSIS — G47.33 OSA (OBSTRUCTIVE SLEEP APNEA): Primary | ICD-10-CM

## 2023-09-06 DIAGNOSIS — G47.10 HYPERSOMNIA: ICD-10-CM

## 2023-09-06 PROCEDURE — 99214 OFFICE O/P EST MOD 30 MIN: CPT | Performed by: INTERNAL MEDICINE

## 2023-09-06 ASSESSMENT — SLEEP AND FATIGUE QUESTIONNAIRES
HOW LIKELY ARE YOU TO NOD OFF OR FALL ASLEEP WHILE SITTING AND TALKING TO SOMEONE: 2
HOW LIKELY ARE YOU TO NOD OFF OR FALL ASLEEP WHILE SITTING AND READING: 0
NECK CIRCUMFERENCE (INCHES): 18.5
HOW LIKELY ARE YOU TO NOD OFF OR FALL ASLEEP WHILE LYING DOWN TO REST IN THE AFTERNOON WHEN CIRCUMSTANCES PERMIT: 3
HOW LIKELY ARE YOU TO NOD OFF OR FALL ASLEEP WHEN YOU ARE A PASSENGER IN A CAR FOR AN HOUR WITHOUT A BREAK: 0
HOW LIKELY ARE YOU TO NOD OFF OR FALL ASLEEP IN A CAR, WHILE STOPPED FOR A FEW MINUTES IN TRAFFIC: 0
ESS TOTAL SCORE: 8
HOW LIKELY ARE YOU TO NOD OFF OR FALL ASLEEP WHILE SITTING INACTIVE IN A PUBLIC PLACE: 0
HOW LIKELY ARE YOU TO NOD OFF OR FALL ASLEEP WHILE WATCHING TV: 0
HOW LIKELY ARE YOU TO NOD OFF OR FALL ASLEEP WHILE SITTING QUIETLY AFTER LUNCH WITHOUT ALCOHOL: 3

## 2023-09-06 NOTE — PROGRESS NOTES
[facility], Critical [facility] Sleep [facility]                                                                  CHIEF COMPLAINT: follow up XI         HPI:   PSG and CPAP titration were reviewed by me and noted below. Results were dicussed with patient and multiple good questions were answered. Started CPAP and feels better. Sleeps deeper and more refreshed. Patient is using CPAP  6-7 hrs/night. Using humidifier. No snoring on CPAP. The pressure is well tolerated. The mask is comfortable. No mask leak. No significant daytime sleepiness. No nodding off when driving. No dry nose or throat in am. No fatigue. No nocturia. No leg edema. Bedtime is 12-1 am and rise time is 8 am. Sleep onset is 30-40 minutes. ESS is 8. Past Medical History:   Diagnosis Date    Asthma     XI (obstructive sleep apnea)        Past Surgical History:    History reviewed. No pertinent surgical history. Allergies:  has No Known Allergies. Social History:    TOBACCO:   reports that he has never smoked. He has never used smokeless tobacco.  ETOH:   reports current alcohol use. Family History:   Uncle with XI       Current Medications:    Current Outpatient Medications:     albuterol sulfate HFA (PROVENTIL;VENTOLIN;PROAIR) 108 (90 Base) MCG/ACT inhaler, , Disp: , Rfl:     mometasone-formoterol (DULERA) 100-5 MCG/ACT inhaler, Inhale 2 puffs into the lungs in the morning and 2 puffs in the evening. (Patient not taking: Reported on 9/6/2023), Disp: 1 each, Rfl: 1        Objective:   PHYSICAL EXAM:    Blood pressure 104/76, pulse 78, height 5' 7\" (1.702 m), weight 241 lb 12.8 oz (109.7 kg), SpO2 98 %.' on RA  Gen: No distress. Obese. BMI of 37.87  Eyes: PERRL. No sclera icterus. No conjunctival injection. ENT: No discharge. Pharynx clear. Mallampati class IV. Neck: Trachea midline. No obvious mass. Neck circumference 18.5\"  Resp: No accessory muscle use. No crackles. No wheezes. No rhonchi.

## 2024-08-23 ENCOUNTER — APPOINTMENT (OUTPATIENT)
Dept: GENERAL RADIOLOGY | Age: 39
End: 2024-08-23
Attending: STUDENT IN AN ORGANIZED HEALTH CARE EDUCATION/TRAINING PROGRAM
Payer: COMMERCIAL

## 2024-08-23 ENCOUNTER — HOSPITAL ENCOUNTER (EMERGENCY)
Age: 39
Discharge: HOME | End: 2024-08-23
Attending: STUDENT IN AN ORGANIZED HEALTH CARE EDUCATION/TRAINING PROGRAM
Payer: COMMERCIAL

## 2024-08-23 VITALS
HEIGHT: 67 IN | DIASTOLIC BLOOD PRESSURE: 78 MMHG | BODY MASS INDEX: 35.31 KG/M2 | TEMPERATURE: 98.6 F | HEART RATE: 70 BPM | OXYGEN SATURATION: 98 % | WEIGHT: 225 LBS | SYSTOLIC BLOOD PRESSURE: 128 MMHG | RESPIRATION RATE: 16 BRPM

## 2024-08-23 DIAGNOSIS — M25.511 ACUTE PAIN OF RIGHT SHOULDER: Primary | ICD-10-CM

## 2024-08-23 PROCEDURE — 99283 EMERGENCY DEPT VISIT LOW MDM: CPT

## 2024-08-23 PROCEDURE — 71046 X-RAY EXAM CHEST 2 VIEWS: CPT

## 2024-08-23 PROCEDURE — 73030 X-RAY EXAM OF SHOULDER: CPT

## 2024-08-23 ASSESSMENT — PAIN - FUNCTIONAL ASSESSMENT: PAIN_FUNCTIONAL_ASSESSMENT: 0-10

## 2024-08-23 ASSESSMENT — LIFESTYLE VARIABLES
HOW MANY STANDARD DRINKS CONTAINING ALCOHOL DO YOU HAVE ON A TYPICAL DAY: PATIENT DOES NOT DRINK
HOW OFTEN DO YOU HAVE A DRINK CONTAINING ALCOHOL: NEVER

## 2024-08-23 ASSESSMENT — PAIN DESCRIPTION - DESCRIPTORS: DESCRIPTORS: SHARP

## 2024-08-23 ASSESSMENT — PAIN DESCRIPTION - LOCATION: LOCATION: SHOULDER

## 2024-08-23 ASSESSMENT — PAIN DESCRIPTION - ORIENTATION: ORIENTATION: RIGHT

## 2024-08-23 ASSESSMENT — PAIN SCALES - GENERAL: PAINLEVEL_OUTOF10: 4

## 2024-08-24 NOTE — DISCHARGE INSTRUCTIONS
Follow-up with your primary doctor early next week for reevaluation.  If continued pain follow-up with orthopedics for further evaluation treatment.  We discussed the limitations of x-ray in the ED and need to need further evaluation for continued pain.  Also return for any neck pain or stiffness, motor or sensory changes, weakness or numbness or any new change or worsening symptoms were always here for reevaluation never hesitate to return

## 2024-08-27 NOTE — ED PROVIDER NOTES
Trachea midline.  No tenderness palpation along the cervical spine or paraspinous region full range of motion of the neck without any pain or discomfort  Extremity: Tenderness palpation over the anterior and posterior aspect of the right shoulder there is no erythema, fluctuance, purulence.  Patient does have full range of motion of the right shoulder but does have pain with elevation over 90 degrees.  Patient has easily palpable distal pulses with normal sensation light touch and less than twos and cap refill there is normal motor and sensory function the medial ulnar and radial distribution there is some mild discomfort along the clavicle, there is no tenderness palpation along the mid to distal humerus, elbow, forearm, wrist or hand. no swelling.  Normal ROM     Heart:  Regular rate and rhythm, normal S1 & S2, no extra heart sounds.    Perfusion:  intact  Respiratory:  Lungs clear to auscultation bilaterally.  Respirations nonlabored.     Abdominal:  Normal bowel sounds.  Soft.  Nontender.  Non distended.  Back:  No CVA tenderness to palpation     Neurological:  Alert and oriented times 3.  No focal neuro deficits.             Psychiatric:  Appropriate    I have reviewed and interpreted all of the currently available lab results from this visit (if applicable):  No results found for this visit on 08/23/24.   Radiographs (if obtained):  Radiologist's Report Reviewed:  XR SHOULDER RIGHT (MIN 2 VIEWS)    Result Date: 8/23/2024  EXAMINATION: THREE XRAY VIEWS OF THE RIGHT SHOULDER; TWO XRAY VIEWS OF THE CHEST 8/23/2024 9:57 pm COMPARISON: 03/03/2023 HISTORY: ORDERING SYSTEM PROVIDED HISTORY: something popped TECHNOLOGIST PROVIDED HISTORY: Reason for exam:->something popped FINDINGS: Right shoulder: No fracture or dislocation. Normal glenohumeral joint. Mild joint space narrowing and marginal osteophytosis at the acromioclavicular joint. Normal visualized lungs. Chest:  The lungs are clear. Normal cardiomediastinal  silhouette. No pleural effusion or pneumothorax. No acute osseous abnormality.     No acute abnormality of the right shoulder or chest. Mild acromioclavicular joint osteoarthritis.     XR CHEST (2 VW)    Result Date: 8/23/2024  EXAMINATION: THREE XRAY VIEWS OF THE RIGHT SHOULDER; TWO XRAY VIEWS OF THE CHEST 8/23/2024 9:57 pm COMPARISON: 03/03/2023 HISTORY: ORDERING SYSTEM PROVIDED HISTORY: something popped TECHNOLOGIST PROVIDED HISTORY: Reason for exam:->something popped FINDINGS: Right shoulder: No fracture or dislocation. Normal glenohumeral joint. Mild joint space narrowing and marginal osteophytosis at the acromioclavicular joint. Normal visualized lungs. Chest:  The lungs are clear. Normal cardiomediastinal silhouette. No pleural effusion or pneumothorax. No acute osseous abnormality.     No acute abnormality of the right shoulder or chest. Mild acromioclavicular joint osteoarthritis.           MDM:    39-year-old male presenting with history seen above.  Vitals on presentation reassuring and patient afebrile satting well on room air.  Physical exam can be seen above.  Patient is neurovascular intact.  X-ray of the right shoulder and chest x-ray nonacute.  There is mild acromioclavicular joint osteoarthritis.  I had a long discussion with patient about the limitations of x-ray and the need for further workup.  At this time I do believe patient is safe for discharge as long as he is agreeable to strict return precautions and close follow up which he is and patient discharged home.      Clinical Impression:  1. Acute pain of right shoulder          Comment: Please note this report has been produced using speech recognition software and may contain errors related to that system including errors in grammar, punctuation, and spelling, as well as words and phrases that may be inappropriate.  Efforts were made to edit the dictations.        Howard Reilly MD  08/27/24 [id number]

## 2024-12-18 ENCOUNTER — HOSPITAL ENCOUNTER (EMERGENCY)
Age: 39
Discharge: HOME | End: 2024-12-18
Payer: COMMERCIAL

## 2024-12-18 VITALS
HEART RATE: 95 BPM | RESPIRATION RATE: 17 BRPM | SYSTOLIC BLOOD PRESSURE: 138 MMHG | WEIGHT: 245 LBS | DIASTOLIC BLOOD PRESSURE: 97 MMHG | BODY MASS INDEX: 39.37 KG/M2 | HEIGHT: 66 IN | OXYGEN SATURATION: 97 % | TEMPERATURE: 98.2 F
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
## 2024-12-18 PROCEDURE — 6360000002 HC RX W HCPCS

## 2024-12-18 PROCEDURE — 90715 TDAP VACCINE 7 YRS/> IM: CPT

## 2024-12-18 PROCEDURE — 90471 IMMUNIZATION ADMIN: CPT

## 2024-12-18 PROCEDURE — 12031 INTMD RPR S/A/T/EXT 2.5 CM/<: CPT

## 2024-12-18 PROCEDURE — 99284 EMERGENCY DEPT VISIT MOD MDM: CPT

## 2024-12-18 PROCEDURE — 6370000000 HC RX 637 (ALT 250 FOR IP)

## 2024-12-18 RX ORDER — ACETAMINOPHEN 500 MG
1000 TABLET ORAL ONCE
Status: COMPLETED | OUTPATIENT
Start: 2024-12-18 | End: 2024-12-18

## 2024-12-18 RX ADMIN — TETANUS TOXOID, REDUCED DIPHTHERIA TOXOID AND ACELLULAR PERTUSSIS VACCINE, ADSORBED 0.5 ML: 5; 2.5; 8; 8; 2.5 SUSPENSION INTRAMUSCULAR at 19:52

## 2024-12-18 RX ADMIN — ACETAMINOPHEN 1000 MG: 500 TABLET ORAL at 20:14

## 2024-12-18 ASSESSMENT — PAIN SCALES - GENERAL
PAINLEVEL_OUTOF10: 6
PAINLEVEL_OUTOF10: 6

## 2024-12-18 ASSESSMENT — PAIN DESCRIPTION - ORIENTATION: ORIENTATION: LEFT

## 2024-12-18 ASSESSMENT — PAIN - FUNCTIONAL ASSESSMENT: PAIN_FUNCTIONAL_ASSESSMENT: 0-10

## 2024-12-18 ASSESSMENT — PAIN DESCRIPTION - LOCATION: LOCATION: ARM

## 2024-12-19 NOTE — ED PROVIDER NOTES
97%   Weight: 111.1 kg (245 lb)   Height: 1.676 m (5' 6\")       Patient was given the following medications:  Medications   tetanus-diphth-acell pertussis (BOOSTRIX) injection 0.5 mL (0.5 mLs IntraMUSCular Given 12/18/24 1952)   acetaminophen (TYLENOL) tablet 1,000 mg (1,000 mg Oral Given 12/18/24 2014)             Is this patient to be included in the SEP-1 Core Measure due to severe sepsis or septic shock?   No   Exclusion criteria - the patient is NOT to be included for SEP-1 Core Measure due to:  2+ SIRS criteria are not met    CONSULTS: (Who and What was discussed)  None  Discussion with Other Profesionals : None    Social Determinants : None    Records Reviewed : None    Ddx:   Dog bite    Medical Decision Making:   This is a 39-year-old male presenting to the emergency department with reports of a dog bite that occurred an hour prior to arrival.  Dog is not his but is known to him as it is his friends.  The dog is a rescue and came with documentation of all vaccinations that were updated.  Patient is unsure whether or not he is updated on his tetanus vaccine status was updated in the emergency department.  He had 5 laceration/puncture wounds on the dorsal and volar aspect of his left lower forearm.  These were thoroughly cleaned and irrigated by nursing staff and the largest laceration did have 2 loose simple interrupted sutures placed to bring the wound closer together for improved wound healing.  Please see procedure note for details on this.  Patient was given Augmentin prescription and was instructed to follow-up with primary care for suture removal in approximately 5 days.  We did discuss wound care as well as signs of worsening infection.  Patient stable for discharge at this time.      I am the Primary Clinician of Record.    FINAL IMPRESSION      1. Dog bite, initial encounter          DISPOSITION/PLAN     DISPOSITION Decision to Discharge    PATIENT REFERRED TO:  Ana Duque APRN -

## 2024-12-19 NOTE — ED NOTES
Discharge instructions reviewed with Pt. Pt verbalizes understanding at this time. Prescriptions/medications reviewed with pt at this time.  Pt condition stable at this time. No concerns voiced.

## 2024-12-19 NOTE — ED NOTES
Completed wound care on pt's L forearm lacerations. Lacerations were cleaned with chlorhexidine, gauze pads, and normal saline. Lacerations were appropriately scrubbed out and irrigated. Left wounds covered with gauze so that PA can examine.   Pt tolerated well.

## 2025-01-20 ENCOUNTER — OFFICE VISIT (OUTPATIENT)
Dept: PULMONOLOGY | Age: 40
End: 2025-01-20
Payer: COMMERCIAL

## 2025-01-20 VITALS
HEART RATE: 97 BPM | OXYGEN SATURATION: 96 % | RESPIRATION RATE: 17 BRPM | HEIGHT: 66 IN | SYSTOLIC BLOOD PRESSURE: 122 MMHG | DIASTOLIC BLOOD PRESSURE: 80 MMHG | WEIGHT: 258.4 LBS | BODY MASS INDEX: 41.53 KG/M2

## 2025-01-20 DIAGNOSIS — G47.33 OSA (OBSTRUCTIVE SLEEP APNEA): Primary | ICD-10-CM

## 2025-01-20 DIAGNOSIS — J45.909 ASTHMA, UNSPECIFIED ASTHMA SEVERITY, UNSPECIFIED WHETHER COMPLICATED, UNSPECIFIED WHETHER PERSISTENT: ICD-10-CM

## 2025-01-20 DIAGNOSIS — G47.10 HYPERSOMNIA: ICD-10-CM

## 2025-01-20 PROCEDURE — 99214 OFFICE O/P EST MOD 30 MIN: CPT | Performed by: INTERNAL MEDICINE

## 2025-01-20 ASSESSMENT — SLEEP AND FATIGUE QUESTIONNAIRES
HOW LIKELY ARE YOU TO NOD OFF OR FALL ASLEEP WHEN YOU ARE A PASSENGER IN A CAR FOR AN HOUR WITHOUT A BREAK: SLIGHT CHANCE OF DOZING
HOW LIKELY ARE YOU TO NOD OFF OR FALL ASLEEP WHILE LYING DOWN TO REST IN THE AFTERNOON WHEN CIRCUMSTANCES PERMIT: HIGH CHANCE OF DOZING
HOW LIKELY ARE YOU TO NOD OFF OR FALL ASLEEP WHILE SITTING AND TALKING TO SOMEONE: WOULD NEVER DOZE
HOW LIKELY ARE YOU TO NOD OFF OR FALL ASLEEP WHILE SITTING AND READING: HIGH CHANCE OF DOZING
HOW LIKELY ARE YOU TO NOD OFF OR FALL ASLEEP WHILE SITTING QUIETLY AFTER LUNCH WITHOUT ALCOHOL: HIGH CHANCE OF DOZING
HOW LIKELY ARE YOU TO NOD OFF OR FALL ASLEEP WHILE WATCHING TV: MODERATE CHANCE OF DOZING
HOW LIKELY ARE YOU TO NOD OFF OR FALL ASLEEP WHILE SITTING INACTIVE IN A PUBLIC PLACE: MODERATE CHANCE OF DOZING
HOW LIKELY ARE YOU TO NOD OFF OR FALL ASLEEP IN A CAR, WHILE STOPPED FOR A FEW MINUTES IN TRAFFIC: WOULD NEVER DOZE
ESS TOTAL SCORE: 14

## 2025-01-20 NOTE — PROGRESS NOTES
DATA reviewed by me:   TSH 2.88  Hemoglobin 14.8  Creatinine 0.8  PSG 6/7/23 AHI 33.5 and desat 79%   CPAP titration 7/5/23 CPAP 17 cmH2O.       CPAP data 08/06-09/04 2023 reviewed by me. Uses 6-7  hrs/night with 97% compliance and AHI of  2. CPAP 17 cmH2O   CPAP data 012/08-01/06 2024 reviewed by me. Uses 7-8 hrs/night with 60% compliance and AHI of  0.8. CPAP 17 cmH2O     Assessment:       Severe XI. CPAP 17 cmH2O. Full face mask. Poor  compliance upon review today- needs supplies and new humidifier     Hypersomnia and fatigue- worse now off CPAP   Obesity class  Asthma on Flovent and Albuterol         Plan:       Order for CPAP supplies  Continue CPAP 17 cmH2O, advised to use 6-8 hrs at night and during naps.  Replacement of mask, tubing, head straps every 3-6 months or sooner if damaged.   Follow up CPAP compliance and pressure adjustment if needed  Sleep hygiene  Avoid sedatives, alcohol and caffeinated drinks at bed time.  No driving motorized vehicles or operating heavy machinery while fatigue, drowsy or sleepy.   Weight loss is also recommended as a long-term intervention.    Discussed with patient educational, supportive and behavioral interventions to improve CPAP compliance.  Continue IBD   Follow up in 1 year or sooner if needed